# Patient Record
Sex: MALE | Race: BLACK OR AFRICAN AMERICAN | Employment: FULL TIME | URBAN - METROPOLITAN AREA
[De-identification: names, ages, dates, MRNs, and addresses within clinical notes are randomized per-mention and may not be internally consistent; named-entity substitution may affect disease eponyms.]

---

## 2020-08-12 ENCOUNTER — HOSPITAL ENCOUNTER (OUTPATIENT)
Age: 45
Discharge: HOME | End: 2020-08-14
Payer: COMMERCIAL

## 2020-08-12 ENCOUNTER — NURSE ONLY (OUTPATIENT)
Dept: PRIMARY CARE CLINIC | Age: 45
End: 2020-08-12

## 2020-08-12 PROCEDURE — U0003 INFECTIOUS AGENT DETECTION BY NUCLEIC ACID (DNA OR RNA); SEVERE ACUTE RESPIRATORY SYNDROME CORONAVIRUS 2 (SARS-COV-2) (CORONAVIRUS DISEASE [COVID-19]), AMPLIFIED PROBE TECHNIQUE, MAKING USE OF HIGH THROUGHPUT TECHNOLOGIES AS DESCRIBED BY CMS-2020-01-R: HCPCS

## 2020-08-14 LAB
SARS-COV-2: NOT DETECTED
SOURCE: NORMAL

## 2021-02-25 ENCOUNTER — HOSPITAL ENCOUNTER (OUTPATIENT)
Age: 46
Discharge: HOME | End: 2021-02-27
Payer: COMMERCIAL

## 2021-02-25 ENCOUNTER — VIRTUAL VISIT (OUTPATIENT)
Dept: FAMILY MEDICINE CLINIC | Age: 46
End: 2021-02-25
Payer: COMMERCIAL

## 2021-02-25 ENCOUNTER — HOSPITAL ENCOUNTER (OUTPATIENT)
Dept: GENERAL RADIOLOGY | Age: 46
Discharge: HOME | End: 2021-02-27
Payer: COMMERCIAL

## 2021-02-25 DIAGNOSIS — M54.42 ACUTE BILATERAL LOW BACK PAIN WITH LEFT-SIDED SCIATICA: Primary | ICD-10-CM

## 2021-02-25 DIAGNOSIS — M54.42 ACUTE BILATERAL LOW BACK PAIN WITH LEFT-SIDED SCIATICA: ICD-10-CM

## 2021-02-25 PROCEDURE — 72110 X-RAY EXAM L-2 SPINE 4/>VWS: CPT

## 2021-02-25 PROCEDURE — 99204 OFFICE O/P NEW MOD 45 MIN: CPT | Performed by: FAMILY MEDICINE

## 2021-02-25 RX ORDER — CYCLOBENZAPRINE HCL 5 MG
5 TABLET ORAL NIGHTLY PRN
Qty: 30 TABLET | Refills: 0 | Status: SHIPPED
Start: 2021-02-25 | End: 2021-04-01 | Stop reason: SDUPTHER

## 2021-02-25 RX ORDER — METHYLPREDNISOLONE 4 MG/1
TABLET ORAL
Qty: 1 KIT | Refills: 0 | Status: SHIPPED
Start: 2021-02-25 | End: 2021-03-02 | Stop reason: ALTCHOICE

## 2021-02-25 SDOH — ECONOMIC STABILITY: TRANSPORTATION INSECURITY
IN THE PAST 12 MONTHS, HAS THE LACK OF TRANSPORTATION KEPT YOU FROM MEDICAL APPOINTMENTS OR FROM GETTING MEDICATIONS?: NOT ASKED

## 2021-02-25 SDOH — HEALTH STABILITY: MENTAL HEALTH: HOW MANY STANDARD DRINKS CONTAINING ALCOHOL DO YOU HAVE ON A TYPICAL DAY?: NOT ASKED

## 2021-02-25 SDOH — ECONOMIC STABILITY: INCOME INSECURITY: HOW HARD IS IT FOR YOU TO PAY FOR THE VERY BASICS LIKE FOOD, HOUSING, MEDICAL CARE, AND HEATING?: NOT ASKED

## 2021-02-25 SDOH — ECONOMIC STABILITY: TRANSPORTATION INSECURITY
IN THE PAST 12 MONTHS, HAS LACK OF TRANSPORTATION KEPT YOU FROM MEETINGS, WORK, OR FROM GETTING THINGS NEEDED FOR DAILY LIVING?: NOT ASKED

## 2021-02-25 SDOH — ECONOMIC STABILITY: FOOD INSECURITY: WITHIN THE PAST 12 MONTHS, YOU WORRIED THAT YOUR FOOD WOULD RUN OUT BEFORE YOU GOT MONEY TO BUY MORE.: NEVER TRUE

## 2021-02-25 SDOH — ECONOMIC STABILITY: FOOD INSECURITY: WITHIN THE PAST 12 MONTHS, THE FOOD YOU BOUGHT JUST DIDN'T LAST AND YOU DIDN'T HAVE MONEY TO GET MORE.: NEVER TRUE

## 2021-02-25 ASSESSMENT — ENCOUNTER SYMPTOMS
SHORTNESS OF BREATH: 0
DIARRHEA: 0
BACK PAIN: 1
RHINORRHEA: 0
CONSTIPATION: 0
VOMITING: 0
SINUS PRESSURE: 0
WHEEZING: 0
ABDOMINAL PAIN: 0
NAUSEA: 0
COUGH: 0
SORE THROAT: 0

## 2021-02-25 ASSESSMENT — PATIENT HEALTH QUESTIONNAIRE - PHQ9
2. FEELING DOWN, DEPRESSED OR HOPELESS: 0
SUM OF ALL RESPONSES TO PHQ QUESTIONS 1-9: 0
1. LITTLE INTEREST OR PLEASURE IN DOING THINGS: 0
SUM OF ALL RESPONSES TO PHQ9 QUESTIONS 1 & 2: 0
SUM OF ALL RESPONSES TO PHQ QUESTIONS 1-9: 0
SUM OF ALL RESPONSES TO PHQ QUESTIONS 1-9: 0

## 2021-02-25 NOTE — PROGRESS NOTES
TeleMedicine Patient Consent    This visit was performed as a virtual video visit using a synchronous, two-way, audio-video telehealth technology platform. Patient identification was verified at the start of the visit, including the patient's telephone number and physical location. I discussed with the patient the nature of our telehealth visits, that:     1. Due to the nature of an audio- video modality, the only components of a physical exam that could be done are the elements supported by direct observation. 2. I would evaluate the patient and recommend diagnostics and treatments based on my assessment. 3. If it was felt that the patient should be evaluated in clinic or an emergency room setting, then they would be directed there. 4. Our sessions are not being recorded and that personal health information is protected. 5. Our team would provide follow up care in person if/when the patient needs it. Patient does agree to proceed with telemedicine consultation. Patient's location: home address in [place]. Is there anyone else present for this visit: No  This visit was completed virtually using [email or web address] me    Physician Location:   [place] [id number]    Time spent: Greater than Not billed by time    2021    TELEHEALTH EVALUATION -- Audio or Visual (During [id number] public health emergency)    HPI:    [facility] (:  1975) has requested an audio/video evaluation for the following concern(s):    Patient is a new patient to establish care. Previous PCP was Dr. Jose Arita at [address] [place] [address]. Patient is presenting today for back pain. He states that he has had the pain for about 3 weeks but has progressively gotten worse. Pain starts in his lower back and radiates down his legs but is worse on the left. He states that he slept wrong prior to the pain starting. He denies any trauma or injury. Pain is sharp, dull, aching, throbbing, stabbing. Pain is 8 out of 10. Alleviating factors: nothing. Exacerbating factors: walking. He has never had pain like this in the past.  He denies numbness or tingling in his legs. He does have weakness in his legs. He was seen by a chiropractor 2 days ago. Review of Systems   Constitutional: Negative for chills, fatigue and fever. HENT: Negative for congestion, ear discharge, ear pain, postnasal drip, rhinorrhea, sinus pressure, sneezing and sore throat. Respiratory: Negative for cough, shortness of breath and wheezing. Cardiovascular: Negative for chest pain, palpitations and leg swelling. Gastrointestinal: Negative for abdominal pain, constipation, diarrhea, nausea and vomiting. Genitourinary: Negative for dysuria, frequency and hematuria. Musculoskeletal: Positive for back pain. Negative for arthralgias and myalgias. Skin: Negative for rash. Neurological: Positive for weakness. Negative for dizziness, light-headedness, numbness and headaches. Prior to Visit Medications    Medication Sig Taking? Authorizing Provider   methylPREDNISolone (MEDROL DOSEPACK) 4 MG tablet Take by mouth.  Yes Anastacio Fitzgerald DO   cyclobenzaprine (FLEXERIL) 5 MG tablet Take 1 tablet by mouth nightly as needed for Muscle spasms Yes Anastacio Fitzgerald DO       Social History     Tobacco Use    Smoking status: Never Smoker    Smokeless tobacco: Never Used   Substance Use Topics    Alcohol use: Yes     Frequency: Monthly or less     Comment: social    Drug use: Never        Allergies   Allergen Reactions    Penicillins Rash , History reviewed. No pertinent past medical history. ,   Past Surgical History:   Procedure Laterality Date    WISDOM TOOTH EXTRACTION     ,   Social History     Tobacco Use    Smoking status: Never Smoker    Smokeless tobacco: Never Used   Substance Use Topics    Alcohol use: Yes     Frequency: Monthly or less     Comment: social    Drug use: Never   ,   Family History   Problem Relation Age of Onset    Colon Cancer Mother 78    Cancer Father         multiple myoloma    No Known Problems Sister     Diabetes Brother     No Known Problems Brother     No Known Problems Sister     No Known Problems Sister     No Known Problems Sister     No Known Problems Sister     Diabetes Sister     Other Sister         pulmonary HTN    No Known Problems Son     No Known Problems Daughter    ,   There is no immunization history on file for this patient.,   Health Maintenance   Topic Date Due    Hepatitis C screen  1975    HIV screen  08/10/1992    DTaP/Tdap/Td vaccine (1 - Tdap) 08/10/1996    Lipid screen  08/10/2017    Flu vaccine (1) 09/01/2020    Hepatitis A vaccine  Aged Out    Hepatitis B vaccine  Aged Out    Hib vaccine  Aged Out    Meningococcal (ACWY) vaccine  Aged Out    Pneumococcal 0-64 years Vaccine  Aged Out       PHYSICAL EXAMINATION:  [ INSTRUCTIONS:  \"[x]\" Indicates a positive item  \"[]\" Indicates a negative item  -- DELETE ALL ITEMS NOT EXAMINED]  Vital Signs: (As obtained by patient/caregiver or practitioner observation)    Blood pressure-  Heart rate-    Respiratory rate-    Temperature-  Pulse oximetry-   Vitals unable to be obtained.      Constitutional: [x] Appears well-developed and well-nourished [x] No apparent distress      [] Abnormal-   Mental status  [x] Alert and awake  [x] Oriented to person/place/time [x]Able to follow commands      Eyes:  EOM    [x]  Normal  [] Abnormal-  Sclera  [x]  Normal  [] Abnormal -         Discharge [x]  None visible  [] Abnormal -    HENT: [x] Normocephalic, atraumatic. [] Abnormal   [x] Mouth/Throat: Mucous membranes are moist.     External Ears [x] Normal  [] Abnormal-     Neck: [x] No visualized mass     Pulmonary/Chest: [x] Respiratory effort normal.  [x] No visualized signs of difficulty breathing or respiratory distress        [] Abnormal-      Musculoskeletal:   [] Normal gait with no signs of ataxia         [x] Normal range of motion of neck        [] Abnormal-       Neurological:        [x] No Facial Asymmetry (Cranial nerve 7 motor function) (limited exam to video visit)          [] No gaze palsy        [] Abnormal-         Skin:        [x] No significant exanthematous lesions or discoloration noted on facial skin         [] Abnormal-            Psychiatric:       [x] Normal Affect [x] No Hallucinations        [] Abnormal-       Other pertinent observable physical exam findings-     Due to this being a TeleHealth encounter, evaluation of the following organ systems is limited: Vitals/Constitutional/EENT/Resp/CV/GI//MS/Neuro/Skin/Heme-Lymph-Imm. ASSESSMENT/PLAN:  Jenney Runner was seen today for establish care. Diagnoses and all orders for this visit:    Acute bilateral low back pain with left-sided sciatica  -     XR LUMBAR SPINE (MIN 4 VIEWS); Future  -     methylPREDNISolone (MEDROL DOSEPACK) 4 MG tablet; Take by mouth. -     cyclobenzaprine (FLEXERIL) 5 MG tablet; Take 1 tablet by mouth nightly as needed for Muscle spasms  -     [facility], Robin Mehta  X-ray ordered  Referral to PT  Home exercises given on AVS.  Will start medrol dose pack  Side effects reviewed  Will start flexeril  Side effects reviewed. Return in about 2 weeks (around 3/11/2021) for back pain. An  electronic signature was used to authenticate this note.     --Allegra Conn,  on 2/25/2021 at 12:05 } Pursuant to the emergency declaration under the [facility], [facility] waiver authority and the [employer] and [employer] Act, this Virtual  Visit was conducted, with patient's consent, to reduce the patient's risk of exposure to COVID-19 and provide continuity of care for an established patient. Services were provided through a video synchronous discussion virtually to substitute for in-person clinic visit.

## 2021-02-25 NOTE — PROGRESS NOTES
Shleby Torres II was read the following message We want to confirm that, for purposes of billing, this is a virtual visit with your provider for which we will submit a claim for reimbursement with your insurance company. You will be responsible for any copays, coinsurance amounts or other amounts not covered by your insurance company. If you do not accept this, unfortunately we will not be able to schedule a virtual visit with the provider. Do you accept?  Felisha Oliver responded YES

## 2021-02-25 NOTE — PATIENT INSTRUCTIONS
Patient Education        Sciatica: Exercises  Introduction  Here are some examples of typical rehabilitation exercises for your condition. Start each exercise slowly. Ease off the exercise if you start to have pain. Your doctor or physical therapist will tell you when you can start these exercises and which ones will work best for you. When you are not being active, find a comfortable position for rest. Some people are comfortable on the floor or a medium-firm bed with a small pillow under their head and another under their knees. Some people prefer to lie on their side with a pillow between their knees. Don't stay in one position for too long. Take short walks (10 to 20 minutes) every 2 to 3 hours. Avoid slopes, hills, and stairs until you feel better. Walk only distances you can manage without pain, especially leg pain. How to do the exercises  Back stretches   1. Get down on your hands and knees on the floor. 2. Relax your head and allow it to droop. Round your back up toward the ceiling until you feel a nice stretch in your upper, middle, and lower back. Hold this stretch for as long as it feels comfortable, or about 15 to 30 seconds. 3. Return to the starting position with a flat back while you are on your hands and knees. 4. Let your back sway by pressing your stomach toward the floor. Lift your buttocks toward the ceiling. 5. Hold this position for 15 to 30 seconds. 6. Repeat 2 to 4 times. Follow-up care is a key part of your treatment and safety. Be sure to make and go to all appointments, and call your doctor if you are having problems. It's also a good idea to know your test results and keep a list of the medicines you take. Where can you learn more? Go to [email or web address] org and sign in to your [employer] account. Enter X329 in the Slinky box to learn more about \"Sciatica: Exercises. \" If you do not have an account, please click on the \"Sign Up Now\" link. Current as of: March 2, 2020               Content Version: 12.6  © 2006-2020 [employer]. Care instructions adapted under license by [facility] ([facility]). If you have questions about a medical condition or this instruction, always ask your healthcare professional. [address] any warranty or liability for your use of this information.

## 2021-03-02 ENCOUNTER — OFFICE VISIT (OUTPATIENT)
Dept: FAMILY MEDICINE CLINIC | Age: 46
End: 2021-03-02
Payer: COMMERCIAL

## 2021-03-02 VITALS
HEIGHT: 68 IN | DIASTOLIC BLOOD PRESSURE: 84 MMHG | WEIGHT: 315 LBS | RESPIRATION RATE: 18 BRPM | HEART RATE: 90 BPM | OXYGEN SATURATION: 96 % | SYSTOLIC BLOOD PRESSURE: 138 MMHG | BODY MASS INDEX: 47.74 KG/M2 | TEMPERATURE: 97.8 F

## 2021-03-02 DIAGNOSIS — M54.42 ACUTE BILATERAL LOW BACK PAIN WITH LEFT-SIDED SCIATICA: Primary | ICD-10-CM

## 2021-03-02 DIAGNOSIS — Z13.220 SCREENING, LIPID: ICD-10-CM

## 2021-03-02 DIAGNOSIS — Z13.1 SCREENING FOR DIABETES MELLITUS: ICD-10-CM

## 2021-03-02 LAB
ALBUMIN SERPL-MCNC: 4.1 G/DL (ref 3.5–5.2)
ALP BLD-CCNC: 80 U/L (ref 40–129)
ALT SERPL-CCNC: 23 U/L (ref 0–40)
ANION GAP SERPL CALCULATED.3IONS-SCNC: 9 MMOL/L (ref 7–16)
AST SERPL-CCNC: 19 U/L (ref 0–39)
BILIRUB SERPL-MCNC: <0.2 MG/DL (ref 0–1.2)
BUN BLDV-MCNC: 21 MG/DL (ref 6–20)
CALCIUM SERPL-MCNC: 9.4 MG/DL (ref 8.6–10.2)
CHLORIDE BLD-SCNC: 99 MMOL/L (ref 98–107)
CHOLESTEROL, TOTAL: 317 MG/DL (ref 0–199)
CO2: 28 MMOL/L (ref 22–29)
CREAT SERPL-MCNC: 1.2 MG/DL (ref 0.7–1.2)
GFR AFRICAN AMERICAN: >60
GFR NON-AFRICAN AMERICAN: >60 ML/MIN/1.73
GLUCOSE BLD-MCNC: 69 MG/DL (ref 74–99)
HDLC SERPL-MCNC: 58 MG/DL
LDL CHOLESTEROL CALCULATED: 231 MG/DL (ref 0–99)
POTASSIUM SERPL-SCNC: 4.4 MMOL/L (ref 3.5–5)
SODIUM BLD-SCNC: 136 MMOL/L (ref 132–146)
TOTAL PROTEIN: 7.9 G/DL (ref 6.4–8.3)
TRIGL SERPL-MCNC: 141 MG/DL (ref 0–149)
VLDLC SERPL CALC-MCNC: 28 MG/DL

## 2021-03-02 PROCEDURE — 99213 OFFICE O/P EST LOW 20 MIN: CPT | Performed by: FAMILY MEDICINE

## 2021-03-02 NOTE — PROGRESS NOTES
Pain:  Patient is here today with complaints of back pain. This is a/an acute problem. This has been going on for 6 week(s). Exacerbating factors include standing, sitting, laying down, walking. Alleviating factors include naproxen. Pain is dull ache and sharp in nature. 8/10. Pain is  Radiating into his legs but is worse on the left. This has not happen to patient before. Imaging to date includes x-ray that showed mild arthritis. Therapy to date includes none. He completed medrol dose pack which he thinks did help because he couldn't even walk at that time. He is starting physical therapy tomorrow. He has not gone back to work yet but is hoping to go back in 1 week after starting therapy. Patient's past medical, surgical, social and/or family history reviewed, updated in chart, and are non-contributory (unless otherwise stated). Medications and allergies also reviewed and updated in chart. Review of Systems:  Constitutional:  No fever, no fatigue, no chills, no headaches, no weight change  Dermatology:  No rash, no mole, no dry or sensitive skin  ENT:  No cough, no sore throat, no sinus pain, no runny nose, no ear pain  Cardiology:  No chest pain, no palpitations, no leg edema, no shortness of breath, no PND  Gastroenterology:  No dysphagia, no abdominal pain, no nausea, no vomiting, no constipation, no diarrhea, no heartburn  Musculoskeletal:  No joint pain, no leg cramps, + back pain, no muscle aches  Respiratory:  No shortness of breath, no orthopnea, no wheezing, no DILLON, no hemoptysis  Urology:  No blood in the urine, no urinary frequency, no urinary incontinence, no urinary urgency, no nocturia, no dysuria      Vitals:    03/02/21 1435   BP: 138/84   Pulse: 90   Resp: 18   Temp: 97.8 °F (36.6 °C)   SpO2: 96%   Weight: (!) 353 lb 9.6 oz (160.4 kg)   Height: 5' 8\" (1.727 m)       Physical Exam  Vitals signs and nursing note reviewed.    Constitutional: Appearance: He is well-developed. HENT:      Head: Normocephalic and atraumatic. Right Ear: External ear normal.      Left Ear: External ear normal.      Nose: Nose normal.   Eyes:      Conjunctiva/sclera: Conjunctivae normal.      Pupils: Pupils are equal, round, and reactive to light. Neck:      Musculoskeletal: Normal range of motion and neck supple. Thyroid: No thyromegaly. Cardiovascular:      Rate and Rhythm: Normal rate and regular rhythm. Heart sounds: Normal heart sounds. Pulmonary:      Effort: Pulmonary effort is normal.      Breath sounds: Normal breath sounds. No wheezing. Abdominal:      General: Bowel sounds are normal.      Palpations: Abdomen is soft. Tenderness: There is no abdominal tenderness. Musculoskeletal:      Lumbar back: He exhibits decreased range of motion, tenderness, pain and spasm. Skin:     General: Skin is warm and dry. Findings: No rash. Neurological:      Mental Status: He is alert and oriented to person, place, and time. Deep Tendon Reflexes: Reflexes are normal and symmetric. Psychiatric:         Behavior: Behavior normal.         Assessment/Plan:      Renetta Robertson was seen today for back pain. Diagnoses and all orders for this visit:    Acute bilateral low back pain with left-sided sciatica  -     diclofenac (VOLTAREN) 50 MG EC tablet; Take 1 tablet by mouth 3 times daily (with meals)  Will start diclofenac  Side effects reviewed  Referred to PT  Reviewed x-ray results  Will await additional recommendations. As above. Call or go to ED immediately if symptoms worsen or persist.  Return in about 4 weeks (around 3/30/2021) for back pain. , or sooner if necessary. Educational materials and/or home exercises printed for patient's review and were included in patient instructions on his/her After Visit Summary and given to patient at the end of visit. Counseled regarding above diagnosis, including possible risks and complications,  especially if left uncontrolled. Counseled regarding the possible side effects, risks, benefits and alternatives to treatment; patient and/or guardian verbalizes understanding, agrees, feels comfortable with and wishes to proceed with above treatment plan. Advised patient to call with any new medication issues, and read all Rx info from pharmacy to assure aware of all possible risks and side effects of medication before taking. Reviewed age and gender appropriate health screening exams and vaccinations. Advised patient regarding importance of keeping up with recommended health maintenance and to schedule as soon as possible if overdue, as this is important in assessing for undiagnosed pathology, especially cancer, as well as protecting against potentially harmful/life threatening disease. Patient and/or guardian verbalizes understanding and agrees with above counseling, assessment and plan. All questions answered. Tea Fitzgerald DO  3/2/2021    I have personally reviewed and updated the chief complaint, HPI, Past Medical, Family and Social History, as well as the above Review of Systems.

## 2021-03-02 NOTE — PATIENT INSTRUCTIONS
Patient Education        diclofenac  Pronunciation:  dye KLOE fen ak  Brand:  Aura Headings, Zorvolex  What is the most important information I should know about diclofenac? Diclofenac can increase your risk of fatal heart attack or stroke. Do not use this medicine just before or after heart bypass surgery (coronary artery bypass graft, or CABG). Diclofenac may also cause stomach or intestinal bleeding, which can be fatal.  What is diclofenac? Diclofenac is a nonsteroidal anti-inflammatory drug (NSAID) that is used to treat mild to moderate pain, or signs and symptoms of osteoarthritis. Diclofenac powder (Cambia) is used to treat a migraine headache attack. Frankey Countess will only treat  a headache that has already begun. It will not prevent headaches or reduce the number of attacks. Diclofenac may also be used for purposes not listed in this medication guide. What should I discuss with my healthcare provider before taking diclofenac? Diclofenac can increase your risk of fatal heart attack or stroke, even if you don't have any risk factors. Do not use this medicine just before or after heart bypass surgery (coronary artery bypass graft, or CABG). Diclofenac may also cause stomach or intestinal bleeding, which can be fatal. These conditions can occur without warning while you are using diclofenac, especially in older adults. You should not use diclofenac if you are allergic to it, or if you have ever had an asthma attack or severe allergic reaction after taking aspirin or an NSAID. Do not use Cambia to treat a cluster headache. Do not use Zipsor if you are allergic to beef or beef protein. Tell your doctor if you have ever had:  · heart disease, high blood pressure;  · ulcers or bleeding in your stomach;  · asthma;  · liver or kidney disease; or  · if you smoke. Taking diclofenac during the last 3 months of pregnancy may harm the unborn baby. Tell your doctor if you are pregnant or plan to become pregnant. It may not be safe to breastfeed while using this medicine. Ask your doctor about any risk. Diclofenac is not approved for use by anyone younger than 25years old. How should I take diclofenac? Different brands of diclofenac contain different amounts of this medicine, and may have different uses. If you switch brands, your dose needs may change. Follow your doctor's instructions about how much medicine to take. Ask your pharmacist if you have any questions about the brand of diclofenac you receive at the pharmacy. Follow all directions on your prescription label and read all medication guides. Your doctor may occasionally change your dose. Use the lowest dose that is effective in treating your condition. Swallow the tablet  whole and do not crush, chew, or break it. Take Zorvolex on an empty stomach, at least 1 hour before or 2 hours after a meal.  Dissolve diclofenac powder (Cambia) with 1 to 2 ounces of water. Do not use any other type of liquid. Stir this mixture and drink all of it right away. Diclofenac powder works best if you take it on an empty stomach. Call your doctor if your headache does not completely go away after taking Cambia. If you use diclofenac long-term, you may need frequent medical tests. Store at room temperature away from moisture and heat. Keep the bottle tightly closed when not in use. What happens if I miss a dose? Take the missed dose as soon as you remember. Skip the missed dose if it is almost time for your next scheduled dose. Do not take extra medicine to make up the missed dose. What happens if I overdose? Seek emergency medical attention or call the Poison Help line at 1-314.814.5473. What should I avoid while taking diclofenac? Avoid drinking alcohol. It may increase your risk of stomach bleeding. Avoid taking aspirin or other NSAIDs unless your doctor tells you to. Ask a doctor or pharmacist before using other medicines for pain, fever, swelling, or cold/flu symptoms. They may contain ingredients similar to diclofenac (such as aspirin, ibuprofen, ketoprofen, or naproxen). What are the possible side effects of diclofenac? Get emergency medical help if you have signs of an allergic reaction (hives, difficult breathing, swelling in your face or throat) or a severe skin reaction (fever, sore throat, burning eyes, skin pain, red or purple skin rash with blistering and peeling). Get emergency medical help if you have signs of a heart attack or stroke: chest pain spreading to your jaw or shoulder, sudden numbness or weakness on one side of the body, slurred speech, feeling short of breath. Stop using diclofenac and call your doctor at once if you have:  · the first sign of any skin rash, no matter how mild;  · flu-like symptoms;  · heart problems --swelling, rapid weight gain, feeling short of breath;  · kidney problems --little or no urinating, painful or difficult urination, swelling in your arms or legs, feeling tired or short of breath;  · liver problems --nausea, diarrhea, stomach pain (upper right side), tiredness, itching, dark urine, jaundice (yellowing of the skin or eyes); or  · signs of stomach bleeding --bloody or tarry stools, coughing up blood or vomit that looks like coffee grounds. Common side effects may include:  · indigestion, gas, nausea, vomiting, stomach pain;  · diarrhea, constipation;  · headache, dizziness, drowsiness;  · abnormal lab tests;  · itching, sweating;  · stuffy nose;  · increased blood pressure; or  · swelling or pain in your arms or legs. This is not a complete list of side effects and others may occur. Call your doctor for medical advice about side effects. You may report side effects to FDA at 9-751-FDA-1393. What other drugs will affect diclofenac? Ask your doctor before using diclofenac if you take an antidepressant. Taking certain antidepressants with an NSAID may cause you to bruise or bleed easily. Tell your doctor about all your other medicines, especially:  · heart or blood pressure medication, including a diuretic or \"water pill\";  · other forms of diclofenac (Arthrotec, Flector, Pennsaid, Solaraze, Voltaren Gel);  · a blood thinner --warfarin, Coumadin, Jantoven; or  · other NSAIDs --aspirin, ibuprofen (Advil, Motrin), naproxen (Aleve), celecoxib (Celebrex), indomethacin, meloxicam, and others. This list is not complete. Other drugs may affect diclofenac, including prescription and over-the-counter medicines, vitamins, and herbal products. Not all possible drug interactions are listed here. Where can I get more information? Your pharmacist can provide more information about diclofenac. Remember, keep this and all other medicines out of the reach of children, never share your medicines with others, and use this medication only for the indication prescribed.

## 2021-03-03 ENCOUNTER — HOSPITAL ENCOUNTER (OUTPATIENT)
Dept: PHYSICAL THERAPY | Age: 46
Setting detail: THERAPIES SERIES
Discharge: HOME | End: 2021-03-03
Payer: COMMERCIAL

## 2021-03-03 PROCEDURE — 97161 PT EVAL LOW COMPLEX 20 MIN: CPT | Performed by: PHYSICAL THERAPIST

## 2021-03-03 ASSESSMENT — PAIN DESCRIPTION - PAIN TYPE: TYPE: ACUTE PAIN

## 2021-03-03 ASSESSMENT — PAIN DESCRIPTION - DESCRIPTORS: DESCRIPTORS: ACHING;CONSTANT

## 2021-03-03 ASSESSMENT — PAIN DESCRIPTION - LOCATION: LOCATION: BACK;BUTTOCKS;LEG

## 2021-03-03 NOTE — PROGRESS NOTES
Physical Therapy  Initial Assessment  Date: 3/3/2021  Patient Name: Tunde Armendariz  MRN: [id number]  : 1975           Subjective   General  Chart Reviewed: Yes  Referring Practitioner: Harinder Crespo   Referral Date : 21  Diagnosis: chronic LBP  PT Visit Information  Onset Date: 21  PT Insurance Information: [place] [employer]  Total # of Visits Approved: 6-8  Total # of Visits to Date: 1  Subjective  Subjective: pt presents to therapy with c/o LBP for ~ 2 weeks of insidious onset; no PMH for LB/LE injury; tells PT that aching persists across mid back into L buttock and  hamstring; x-ray + for mild DDD L5-S1; no c/o N/T or buckling B LE's; MEDS do help; sleep is fine; no ortho/neuro/pain management at this time; RTD for follow-up 21  Pain Screening  Patient Currently in Pain: Yes  Pain Assessment  Pain Assessment: 0-10  Pain Level: 6  Pain Type: Acute pain  Pain Location: Back;Buttocks;Leg  Pain Orientation: Right;Left  Pain Descriptors: Aching;Constant  Functional Pain Assessment: Prevents or interferes with many active not passive activities  Vital Signs  Patient Currently in Pain: Yes    Objective     Observation/Palpation  Palpation: discomfort noted across B LB paraspinals L1-5 into SI lines  Observation: posture:  level ASIS/PSIS/iliacs; ant pelvic tilt noted    AROM RLE (degrees)  RLE AROM: WNL  AROM LLE (degrees)  LLE AROM : WNL  AROM RUE (degrees)  RUE AROM : WNL  AROM LUE (degrees)  LUE AROM : WNL  Spine  Lumbar: limited ~ 25% WNL for all ranges with no c/o radiculopathy noted  Special Tests: hyperflex/rot  +/+; slump  + L LE    Strength Other  Other: grossly 5/5 for all ranges B LE's     Additional Measures  Other: endurance for all prolonged activities is GOOD  Sensation  Overall Sensation Status: WNL     Ambulation  Ambulation?: Yes  Ambulation 1  Surface: level tile  Device: No Device  Assistance: Independent  Quality of Gait: normal mechanics noted B LE's/trunkk  Balance  Comments: static/dynamic balance is GOOD+/NORMAL     Assessment   Conditions Requiring Skilled Therapeutic Intervention  Body structures, Functions, Activity limitations: Decreased ROM; Decreased endurance  Assessment: pain noted across B sides LB/L LE with all prolonged activities, 6/10  Prognosis: Fair;Good  Decision Making: Low Complexity  Activity Tolerance  Activity Tolerance: Patient Tolerated treatment well       Plan   Plan  Times per week: pt to be seen 2x/week/3-4 weeks  Current Treatment Recommendations: ROM, Strengthening, Endurance Training, Modalities, Home Exercise Program    Goals  Short term goals  Time Frame for Short term goals: 2 weeks  Short term goal 1: decrease pain across B sides LB/L LE with all prolonged activities, 3/10  Short term goal 2: restore LB AROM to WNL for all ranges  Short term goal 3: improve endurance for all prolonged activities to GOOD  Long term goals  Time Frame for Long term goals : 3-4 weeks  Long term goal 1: decrease pain across B sides LB/L LE with all prolonged activities, 0-2/10  Long term goal 2: improve endurance for all prolonged activities to [address] term goal 3: assure I with [facility] for home management of condition    Patient goals : to get rid of the pain in his back/buttock       Presley Beth, PT

## 2021-03-03 NOTE — PROGRESS NOTES
[address] [facility]                Phone: [phone]   Fax: [phone]    Physical Therapy Daily Treatment Note  Date:  3/3/2021    Patient Name:  Arvie Opitz    :  1975  MRN: [id number]    Evaluating therapist:  BOBBY Fletcher                (3/3/21)  Restrictions/Precautions:    Diagnosis:  acute LBP with sciatica  Treatment Diagnosis:    Insurance/Certification information:  [employer] [address] [employer] [address] [place] [address]  Referring Physician:  Yoli Styles. Plan of care signed (Y/N):    Visit# / total visits:    Pain level: 6/10   Time In:  Time Out:    Subjective:      Exercises:  Exercise/Equipment Resistance/Repetitions Other comments   StepOne              tball flex/rot             rot st     SKTC     piriformis st     hamst st            pelvic tilts               bridges                                                                Other Therapeutic Activities:      Home Exercise Program:  provided 3/3/21    Manual Treatments:      Modalities:  IFC/MH to LB PRN     Timed Code Treatment Minutes: Total Treatment Minutes:      Treatment/Activity Tolerance:  [] Patient tolerated treatment well [] Patient limited by fatique  [] Patient limited by pain  [] Patient limited by other medical complications  [] Other:     Prognosis: [] Good [] Fair  [] Poor    Patient Requires Follow-up: [] Yes  [] No    Plan:   [] Continue per plan of care [] Alter current plan (see comments)  [] Plan of care initiated [] Hold pending MD visit [] Discharge  Plan for Next Session:      See Weekly Progress Note: []  Yes  []  No  Next due:        Electronically signed by:   Maisha Babcock

## 2021-03-05 ENCOUNTER — TELEPHONE (OUTPATIENT)
Dept: FAMILY MEDICINE CLINIC | Age: 46
End: 2021-03-05

## 2021-03-05 NOTE — TELEPHONE ENCOUNTER
Patient states he was told to call after he went to physical therapy. He says that he is okay, he feels a little stiff but no big deal. Wants to know if you can sign his forms to return to work.

## 2021-03-08 ENCOUNTER — HOSPITAL ENCOUNTER (OUTPATIENT)
Dept: PHYSICAL THERAPY | Age: 46
Setting detail: THERAPIES SERIES
Discharge: HOME | End: 2021-03-08
Payer: COMMERCIAL

## 2021-03-08 PROCEDURE — G0283 ELEC STIM OTHER THAN WOUND: HCPCS

## 2021-03-08 PROCEDURE — 97110 THERAPEUTIC EXERCISES: CPT

## 2021-03-08 NOTE — PROGRESS NOTES
[address] [facility]                Phone: [phone]   Fax: [phone]    Physical Therapy Daily Treatment Note  Date:  3/8/2021    Patient Name:  Riki Essex    :  1975  MRN: [id number]    Evaluating therapist:  BOBBY Andersen                (3/3/21)  Restrictions/Precautions:    Diagnosis:  acute LBP with sciatica  Treatment Diagnosis:    Insurance/Certification information:  [address] [place] [address]  Referring Physician:  Ariel Maldonado. Plan of care signed (Y/N):    Visit# / total visits:    Pain level: 6-7/10   Time In: 1430  Time Out:  1535    Subjective:  Pt reported his pain is in LB and radiating into L HS. Exercises:  Exercise/Equipment Resistance/Repetitions Other comments   StepOne   X 10 mins           tball flex/rot  20 sec x 3 reps            rot st 15 sec x 3 reps     SKTC 2-3 sec x 10 reps B    piriformis st 20 sec x 3 reps B    hamst st 20 sec x 3 reps B            pelvic tilts 5 sec x 10 reps               bridges  X 10 reps                                                               Other   Pt performed above ex w/ fair pacing and effort.  Pain remained 6/10 after session    Home Exercise Program:  provided 3/3/21    Manual Treatments:  NA    Modalities:  IFC/MH to LB x 15 mins     Timed Code Treatment Minutes:  50    Total Treatment Minutes:  65    Treatment/Activity Tolerance:  [x] Patient tolerated treatment well [] Patient limited by fatique  [] Patient limited by pain  [] Patient limited by other medical complications  [] Other:     Prognosis: [x] Good [] Fair  [] Poor    Patient Requires Follow-up: [x] Yes  [] No    Plan:   [x] Continue per plan of care [] Alter current plan (see comments)  [] Plan of care initiated [] Hold pending MD visit [] Discharge  Plan for Next Session:      See Weekly Progress Note: []  Yes  [x]  No  Next due:        Electronically signed by:  Miguel A Sen PTA [id number]

## 2021-03-10 ENCOUNTER — HOSPITAL ENCOUNTER (OUTPATIENT)
Dept: PHYSICAL THERAPY | Age: 46
Setting detail: THERAPIES SERIES
Discharge: HOME | End: 2021-03-10
Payer: COMMERCIAL

## 2021-03-10 PROCEDURE — 97110 THERAPEUTIC EXERCISES: CPT

## 2021-03-10 PROCEDURE — G0283 ELEC STIM OTHER THAN WOUND: HCPCS

## 2021-03-10 NOTE — PROGRESS NOTES
[address] [facility]                Phone: [phone]   Fax: [phone]    Physical Therapy Daily Treatment Note  Date:  3/10/2021    Patient Name:  Teodoro Gastelum    :  1975  MRN: [id number]    Evaluating therapist:  BOBBY Stahl                (3/3/21)  Restrictions/Precautions:    Diagnosis:  acute LBP with sciatica  Treatment Diagnosis:    Insurance/Certification information:  [address] [place] [address]  Referring Physician:  Eder Montejo. Plan of care signed (Y/N):    Visit# / total visits:  3/6  Pain level: 5/10   Time In: 1430  Time Out:  1535    Subjective:  Pt reported his pain is in LB and radiating into L HS. Pt reported that after his session on Monday, he had significant pain on Tuesday, and difficulty amb that day. Exercises:  Exercise/Equipment Resistance/Repetitions Other comments   StepOne   X 10 mins           tball flex/rot  20 sec x 3 reps            rot st 15 sec x 3 reps     SKTC 2-3 sec x 10 reps B    piriformis st 20 sec x 3 reps B    hamst st 20 sec x 3 reps B            pelvic tilts 5 sec x 10 reps               bridges  X 10 reps                                                               Other   Pt performed above ex w/ fair pacing and effort.  Pain remained /10 after session    Home Exercise Program:  provided 3/3/21    Manual Treatments:  NA    Modalities:  IFC/MH to LB x 15 mins     Timed Code Treatment Minutes:  50    Total Treatment Minutes:  65    Treatment/Activity Tolerance:  [x] Patient tolerated treatment well [] Patient limited by fatique  [] Patient limited by pain  [] Patient limited by other medical complications  [] Other:     Prognosis: [x] Good [] Fair  [] Poor    Patient Requires Follow-up: [x] Yes  [] No    Plan:   [x] Continue per plan of care [] Alter current plan (see comments)  [] Plan of care initiated [] Hold pending MD visit [] Discharge  Plan for Next Session:      See Weekly Progress Note: []  Yes  [x]  No  Next due:        Electronically signed by:  Lynsey Potter PTA [id number]

## 2021-03-11 ENCOUNTER — TELEPHONE (OUTPATIENT)
Dept: FAMILY MEDICINE CLINIC | Age: 46
End: 2021-03-11

## 2021-03-11 NOTE — TELEPHONE ENCOUNTER
Please call Lisbet Muller, we had already completed his paperwork for [employer] and now I have another form on my desk. Was something wrong with the previous one?

## 2021-03-15 ENCOUNTER — HOSPITAL ENCOUNTER (OUTPATIENT)
Dept: PHYSICAL THERAPY | Age: 46
Setting detail: THERAPIES SERIES
Discharge: HOME | End: 2021-03-15
Payer: COMMERCIAL

## 2021-03-15 PROCEDURE — 97110 THERAPEUTIC EXERCISES: CPT

## 2021-03-15 PROCEDURE — G0283 ELEC STIM OTHER THAN WOUND: HCPCS

## 2021-03-15 NOTE — PROGRESS NOTES
[address] [facility]                Phone: [phone]   Fax: [phone]    Physical Therapy Daily Treatment Note  Date:  3/15/2021    Patient Name:  Teodoro Gastelum    :  1975  MRN: [id number]    Evaluating therapist:  BOBBY Stahl                (3/3/21)  Restrictions/Precautions:    Diagnosis:  acute LBP with sciatica  Treatment Diagnosis:    Insurance/Certification information:  [address] [place] [address]  Referring Physician:  Eder Montejo. Plan of care signed (Y/N):    Visit# / total visits:    Pain level: 7/10   Time In: 1335  Time Out:  1432    Subjective:  Pt reported his pain is in LB and radiating into L HS. Pt reports that pain gets worse the longer he is up and moving throughout the day. Pt reports he gets very little to no relief from his pain meds. Exercises:  Exercise/Equipment Resistance/Repetitions Other comments   StepOne   X 10 mins           tball flex/rot  20 sec x 3 reps            rot st 15 sec x 3 reps     SKTC  W/ OFB behind knee 3-5 sec x 10 reps B    piriformis st  20 sec x 3 reps B    hamst st seated w/ stool 20 sec x 3 reps B            pelvic tilts 5 sec x 10 reps               bridges  5 sec X 10 reps                                                               Other   Pt performed above ex w/ fair pacing and effort. Pain remained 7/10 after session, and receiving IFC as noted. Pt reported compliance w/HEP as tolerated.      Home Exercise Program:  provided 3/3/21    Manual Treatments:  NA    Modalities:  IFC/MH to LB x 15 mins     Timed Code Treatment Minutes:  42    Total Treatment Minutes:  57    Treatment/Activity Tolerance:  [] Patient tolerated treatment well [] Patient limited by fatique  [x] Patient limited by pain  [] Patient limited by other medical complications  [] Other:     Prognosis: [] Good [x] Fair  [] Poor    Patient Requires Follow-up: [x] Yes  [] No    Plan:   [x] Continue per plan of care [] Alter current plan (see comments)  [] Plan of care initiated [] Hold pending MD visit [] Discharge  Plan for Next Session:      See Weekly Progress Note: []  Yes  [x]  No  Next due:        Electronically signed by:  Kiley Hall PTA [id number]

## 2021-03-17 ENCOUNTER — HOSPITAL ENCOUNTER (OUTPATIENT)
Dept: PHYSICAL THERAPY | Age: 46
Setting detail: THERAPIES SERIES
Discharge: HOME | End: 2021-03-17
Payer: COMMERCIAL

## 2021-03-17 PROCEDURE — 97110 THERAPEUTIC EXERCISES: CPT

## 2021-03-17 PROCEDURE — G0283 ELEC STIM OTHER THAN WOUND: HCPCS

## 2021-03-17 NOTE — PROGRESS NOTES
[address] [facility]                Phone: [phone]   Fax: [phone]    Physical Therapy Daily Treatment Note  Date:  3/17/2021    Patient Name:  Gail Ewing    :  1975  MRN: [id number]    Evaluating therapist:  BOBBY Alba                (3/3/21)  Restrictions/Precautions:    Diagnosis:  acute LBP with sciatica  Treatment Diagnosis:    Insurance/Certification information:  [address] [place] [address]  Referring Physician:  Joe Jacobo Plan of care signed (Y/N):    Visit# / total visits:  6  Pain level: 5-7/10   Time In: 1100  Time Out:  1155    Subjective:  Pt reported his pain is in LB and radiating into L HS. Pt reports that pain gets worse the longer he is up and moving throughout the day. Pt reports he gets very little to no relief from his pain meds. Pt also reporting now he is getting \"numbness \" down L LE and into his L heel. Exercises:  Exercise/Equipment Resistance/Repetitions Other comments   StepOne   X 10 mins           tball flex/rot  20 sec x 3 reps            rot st 15 sec x 3 reps     SKTC  W/ OFB behind knee 3-5 sec x 10 reps B    piriformis st  20 sec x 3 reps B    hamst st seated w/ stool 20 sec x 3 reps B            pelvic tilts 5 sec x 10 reps               bridges  5 sec X 10 reps                                                               Other   Pt performed above ex w/ fair pacing and effort. Pain remained 7/10 after session, and receiving IFC as noted. Pt reported compliance w/HEP as tolerated. Pt's LB rom remains limited by pain and not WFL. Pt's endurance for prolonged activity remains fair to fair - dependant on pain    Home Exercise Program:  provided 3/3/21.   3/17/21 pt provided w/ HEP inclusive of all ex except step one and Tball flex/rot as pt does not have access to the equip. A handout was provided and reviewed w/ pt.  Pt demonstrated good form and understanding of ex to therapist.     Manual Treatments:  NA    Modalities:  IFC/MH to LB x 15 mins     Timed Code Treatment Minutes:  40    Total Treatment Minutes:  55    Treatment/Activity Tolerance:  [] Patient tolerated treatment well [] Patient limited by fatique  [x] Patient limited by pain  [] Patient limited by other medical complications  [] Other:     Prognosis: [] Good [x] Fair  [] Poor    Patient Requires Follow-up: [] Yes  [] No    Plan:   [] Continue per plan of care [] Alter current plan (see comments)  [] Plan of care initiated [] Hold pending MD visit [x] Discharge    Plan for Next Session:  Pt was referred back to his physician for further acessment/ treatment      See Weekly Progress Note: []  Yes  [x]  No  Next due:        Electronically signed by:  Dalton Armas PTA [id number]

## 2021-03-18 ENCOUNTER — OFFICE VISIT (OUTPATIENT)
Dept: FAMILY MEDICINE CLINIC | Age: 46
End: 2021-03-18
Payer: COMMERCIAL

## 2021-03-18 DIAGNOSIS — M54.42 ACUTE BILATERAL LOW BACK PAIN WITH LEFT-SIDED SCIATICA: Primary | ICD-10-CM

## 2021-03-18 PROCEDURE — 99213 OFFICE O/P EST LOW 20 MIN: CPT | Performed by: FAMILY MEDICINE

## 2021-03-18 ASSESSMENT — ENCOUNTER SYMPTOMS
ABDOMINAL PAIN: 0
RHINORRHEA: 0
BACK PAIN: 1
NAUSEA: 0
COUGH: 0
WHEEZING: 0
SORE THROAT: 0
CONSTIPATION: 0
VOMITING: 0
SHORTNESS OF BREATH: 0
SINUS PRESSURE: 0
DIARRHEA: 0

## 2021-03-18 NOTE — PROGRESS NOTES
TeleMedicine Patient Consent    This visit was performed as a virtual video visit using a synchronous, two-way, audio-video telehealth technology platform. Patient identification was verified at the start of the visit, including the patient's telephone number and physical location. I discussed with the patient the nature of our telehealth visits, that:     1. Due to the nature of an audio- video modality, the only components of a physical exam that could be done are the elements supported by direct observation. 2. I would evaluate the patient and recommend diagnostics and treatments based on my assessment. 3. If it was felt that the patient should be evaluated in clinic or an emergency room setting, then they would be directed there. 4. Our sessions are not being recorded and that personal health information is protected. 5. Our team would provide follow up care in person if/when the patient needs it. Patient does agree to proceed with telemedicine consultation. Patient's location: home address in [place]. Is there anyone else present for this visit: No  This visit was completed virtually using [email or web address] me    Physician Location:   [place] [address]    Time spent: Greater than Not billed by time    3/18/2021    TELEHEALTH EVALUATION -- Audio or Visual (During CKAQQ-02 public health emergency)    HPI:    Prince Castillo II (:  1975) has requested an audio/video evaluation for the following concern(s):    Patient is presenting today for follow up of back pain. He states that his pain is now radiating down his left leg. He states that it started about 1 week ago. Pain is sharp, dull, aching, throbbing. He states that he has numbness of his left leg. He states numbness down the back of his left leg into his heel of his foot. He denies any trauma or injury. Pain is 7 out of 10. Alleviating factors: flexeril, voltaren, and heat.   Exacerbating factors: full ROM. He was going to therapy but states that the insurance told him yesterday was his last day. He states that his understanding is insurance won't pay for more therapy. They only approved 4 sessions. He states that he has weakness in his leg as well. Review of Systems   Constitutional: Negative for chills, fatigue and fever. HENT: Negative for congestion, ear discharge, ear pain, postnasal drip, rhinorrhea, sinus pressure, sneezing and sore throat. Respiratory: Negative for cough, shortness of breath and wheezing. Cardiovascular: Negative for chest pain, palpitations and leg swelling. Gastrointestinal: Negative for abdominal pain, constipation, diarrhea, nausea and vomiting. Genitourinary: Negative for dysuria, frequency and hematuria. Musculoskeletal: Positive for back pain. Negative for arthralgias and myalgias. Skin: Negative for rash. Neurological: Positive for weakness and numbness. Negative for dizziness, light-headedness and headaches. Prior to Visit Medications    Medication Sig Taking? Authorizing Provider   diclofenac (VOLTAREN) 50 MG EC tablet Take 1 tablet by mouth 3 times daily (with meals)  Jaguar Miller DO   cyclobenzaprine (FLEXERIL) 5 MG tablet Take 1 tablet by mouth nightly as needed for Muscle spasms  Jaguar Miller, DO       Social History     Tobacco Use    Smoking status: Never Smoker    Smokeless tobacco: Never Used   Substance Use Topics    Alcohol use: Yes     Frequency: Monthly or less     Comment: social    Drug use: Never        Allergies   Allergen Reactions    Penicillins Rash   , History reviewed. No pertinent past medical history. ,   Past Surgical History:   Procedure Laterality Date    WISDOM TOOTH EXTRACTION     ,   Social History     Tobacco Use    Smoking status: Never Smoker    Smokeless tobacco: Never Used   Substance Use Topics    Alcohol use: Yes     Frequency: Monthly or less     Comment: social    Drug use: Never   , Family History   Problem Relation Age of Onset    Colon Cancer Mother 78    Cancer Father         multiple myoloma    No Known Problems Sister     Diabetes Brother     No Known Problems Brother     No Known Problems Sister     No Known Problems Sister     No Known Problems Sister     No Known Problems Sister     Diabetes Sister     Other Sister         pulmonary HTN    No Known Problems Son     No Known Problems Daughter    ,   There is no immunization history on file for this patient.,   Health Maintenance   Topic Date Due    Hepatitis C screen  Never done    HIV screen  Never done    DTaP/Tdap/Td vaccine (1 - Tdap) Never done    Flu vaccine (1) Never done    Lipid screen  03/02/2026    Hepatitis A vaccine  Aged Out    Hepatitis B vaccine  Aged Out    Hib vaccine  Aged Out    Meningococcal (ACWY) vaccine  Aged Out    Pneumococcal 0-64 years Vaccine  Aged Out       PHYSICAL EXAMINATION:  [ INSTRUCTIONS:  \"[x]\" Indicates a positive item  \"[]\" Indicates a negative item  -- DELETE ALL ITEMS NOT EXAMINED]  Vital Signs: (As obtained by patient/caregiver or practitioner observation)    Blood pressure-  Heart rate-    Respiratory rate-    Temperature-  Pulse oximetry-   Vitals unable to be obtained. Constitutional: [x] Appears well-developed and well-nourished [x] No apparent distress      [] Abnormal-   Mental status  [x] Alert and awake  [x] Oriented to person/place/time [x]Able to follow commands      Eyes:  EOM    [x]  Normal  [] Abnormal-  Sclera  [x]  Normal  [] Abnormal -         Discharge [x]  None visible  [] Abnormal -    HENT:   [x] Normocephalic, atraumatic.   [] Abnormal   [x] Mouth/Throat: Mucous membranes are moist.     External Ears [x] Normal  [] Abnormal-     Neck: [x] No visualized mass     Pulmonary/Chest: [x] Respiratory effort normal.  [x] No visualized signs of difficulty breathing or respiratory distress        [] Abnormal-      Musculoskeletal:   [] Normal gait with no signs of ataxia         [x] Normal range of motion of neck        [] Abnormal-       Neurological:        [x] No Facial Asymmetry (Cranial nerve 7 motor function) (limited exam to video visit)          [] No gaze palsy        [] Abnormal-         Skin:        [x] No significant exanthematous lesions or discoloration noted on facial skin         [] Abnormal-            Psychiatric:       [x] Normal Affect [x] No Hallucinations        [] Abnormal-       Other pertinent observable physical exam findings-     Due to this being a TeleHealth encounter, evaluation of the following organ systems is limited: Vitals/Constitutional/EENT/Resp/CV/GI//MS/Neuro/Skin/Heme-Lymph-Imm. ASSESSMENT/PLAN:  Diagnoses and all orders for this visit:    Acute bilateral low back pain with left-sided sciatica  -     MRI LUMBAR SPINE [address]; Future  MRI ordered  Will await results  He has completed PT with symptoms worsening. Return if symptoms worsen or fail to improve. An  electronic signature was used to authenticate this note. --Elmer Martinez DO on 3/18/2021 at 4:16 }    Pursuant to the emergency declaration under the [facility], [facility] waiver authority and the [employer] Act, this Virtual  Visit was conducted, with patient's consent, to reduce the patient's risk of exposure to COVID-19 and provide continuity of care for an established patient. Services were provided through a video synchronous discussion virtually to substitute for in-person clinic visit.

## 2021-03-18 NOTE — PATIENT INSTRUCTIONS
Patient Education        Sciatica: Exercises  Introduction  Here are some examples of typical rehabilitation exercises for your condition. Start each exercise slowly. Ease off the exercise if you start to have pain. Your doctor or physical therapist will tell you when you can start these exercises and which ones will work best for you. When you are not being active, find a comfortable position for rest. Some people are comfortable on the floor or a medium-firm bed with a small pillow under their head and another under their knees. Some people prefer to lie on their side with a pillow between their knees. Don't stay in one position for too long. Take short walks (10 to 20 minutes) every 2 to 3 hours. Avoid slopes, hills, and stairs until you feel better. Walk only distances you can manage without pain, especially leg pain. How to do the exercises  Back stretches   1. Get down on your hands and knees on the floor. 2. Relax your head and allow it to droop. Round your back up toward the ceiling until you feel a nice stretch in your upper, middle, and lower back. Hold this stretch for as long as it feels comfortable, or about 15 to 30 seconds. 3. Return to the starting position with a flat back while you are on your hands and knees. 4. Let your back sway by pressing your stomach toward the floor. Lift your buttocks toward the ceiling. 5. Hold this position for 15 to 30 seconds. 6. Repeat 2 to 4 times. Follow-up care is a key part of your treatment and safety. Be sure to make and go to all appointments, and call your doctor if you are having problems. It's also a good idea to know your test results and keep a list of the medicines you take. Where can you learn more? Go to https://[employer].ru. org and sign in to your [email or web address] account. Enter J227 in the [employer] box to learn more about \"Sciatica: Exercises. \"     If you do not have an account, please click on the \"Sign Up Now\" link.  Current as of: November 16, 2020               Content Version: 12.8  © 1130-7088 [employer]. Care instructions adapted under license by [facility] ([facility]). If you have questions about a medical condition or this instruction, always ask your healthcare professional. [address] any warranty or liability for your use of this information.

## 2021-03-26 ENCOUNTER — IMMUNIZATION (OUTPATIENT)
Dept: PRIMARY CARE CLINIC | Age: 46
End: 2021-03-26
Payer: COMMERCIAL

## 2021-03-26 PROCEDURE — 91300 COVID-19, PFIZER VACCINE 30MCG/0.3ML DOSE: CPT | Performed by: PHYSICIAN ASSISTANT

## 2021-03-26 PROCEDURE — 0001A COVID-19, PFIZER VACCINE 30MCG/0.3ML DOSE: CPT | Performed by: PHYSICIAN ASSISTANT

## 2021-03-29 ENCOUNTER — HOSPITAL ENCOUNTER (OUTPATIENT)
Dept: MRI IMAGING | Age: 46
Discharge: HOME | End: 2021-03-31
Payer: COMMERCIAL

## 2021-03-29 DIAGNOSIS — M54.42 ACUTE BILATERAL LOW BACK PAIN WITH LEFT-SIDED SCIATICA: ICD-10-CM

## 2021-03-29 PROCEDURE — 72148 MRI LUMBAR SPINE W/O DYE: CPT

## 2021-04-01 ENCOUNTER — OFFICE VISIT (OUTPATIENT)
Dept: FAMILY MEDICINE CLINIC | Age: 46
End: 2021-04-01
Payer: COMMERCIAL

## 2021-04-01 VITALS
HEIGHT: 68 IN | DIASTOLIC BLOOD PRESSURE: 88 MMHG | OXYGEN SATURATION: 98 % | SYSTOLIC BLOOD PRESSURE: 124 MMHG | HEART RATE: 110 BPM | WEIGHT: 315 LBS | BODY MASS INDEX: 47.74 KG/M2 | RESPIRATION RATE: 17 BRPM | TEMPERATURE: 98.2 F

## 2021-04-01 DIAGNOSIS — M54.16 LUMBAR RADICULOPATHY: ICD-10-CM

## 2021-04-01 DIAGNOSIS — M54.42 ACUTE BILATERAL LOW BACK PAIN WITH LEFT-SIDED SCIATICA: Primary | ICD-10-CM

## 2021-04-01 PROCEDURE — 99214 OFFICE O/P EST MOD 30 MIN: CPT | Performed by: FAMILY MEDICINE

## 2021-04-01 RX ORDER — CYCLOBENZAPRINE HCL 5 MG
5 TABLET ORAL NIGHTLY PRN
Qty: 30 TABLET | Refills: 0 | Status: SHIPPED | OUTPATIENT
Start: 2021-04-01

## 2021-04-01 NOTE — PATIENT INSTRUCTIONS
Patient Education        Sciatica: Exercises  Introduction  Here are some examples of typical rehabilitation exercises for your condition. Start each exercise slowly. Ease off the exercise if you start to have pain. Your doctor or physical therapist will tell you when you can start these exercises and which ones will work best for you. When you are not being active, find a comfortable position for rest. Some people are comfortable on the floor or a medium-firm bed with a small pillow under their head and another under their knees. Some people prefer to lie on their side with a pillow between their knees. Don't stay in one position for too long. Take short walks (10 to 20 minutes) every 2 to 3 hours. Avoid slopes, hills, and stairs until you feel better. Walk only distances you can manage without pain, especially leg pain. How to do the exercises  Back stretches   1. Get down on your hands and knees on the floor. 2. Relax your head and allow it to droop. Round your back up toward the ceiling until you feel a nice stretch in your upper, middle, and lower back. Hold this stretch for as long as it feels comfortable, or about 15 to 30 seconds. 3. Return to the starting position with a flat back while you are on your hands and knees. 4. Let your back sway by pressing your stomach toward the floor. Lift your buttocks toward the ceiling. 5. Hold this position for 15 to 30 seconds. 6. Repeat 2 to 4 times. Follow-up care is a key part of your treatment and safety. Be sure to make and go to all appointments, and call your doctor if you are having problems. It's also a good idea to know your test results and keep a list of the medicines you take. Where can you learn more? Go to https://People Powersatya.[facility]. org and sign in to your Celles account. Enter I463 in the [employer] box to learn more about \"Sciatica: Exercises. \"     If you do not have an account, please click on the \"Sign Up Now\" link.  Current as of: November 16, 2020               Content Version: 12.8  © 3810-0029 [employer]. Care instructions adapted under license by [facility] ([facility]). If you have questions about a medical condition or this instruction, always ask your healthcare professional. [address] any warranty or liability for your use of this information.

## 2021-04-01 NOTE — PROGRESS NOTES
Pain:  Patient is here today with complaints of lower back pain pain. This is a/an chronic problem. This has been going on for 2 month(s). Exacerbating factors include standing. Alleviating factors include none. Pain is dull in nature. 3/10. Pain is  radiating. This has happen to patient before. Imaging to date includes 3/29/21. Therapy to date includes about a week ago. Labs to date include 3/2/21. He has numbness in his left foot. He states that they quit paying for his physical therapy so he can't go to therapy anymore. He had an MRI that showed disc bulge with midline and left paracentral disc extrusion at L5-S1. Patient's past medical, surgical, social and/or family history reviewed, updated in chart, and are non-contributory (unless otherwise stated). Medications and allergies also reviewed and updated in chart. Review of Systems:  Constitutional:  No fever, no fatigue, no chills, no headaches, no weight change  Dermatology:  No rash, no mole, no dry or sensitive skin  ENT:  No cough, no sore throat, no sinus pain, no runny nose, no ear pain  Cardiology:  No chest pain, no palpitations, no leg edema, no shortness of breath, no PND  Gastroenterology:  No dysphagia, no abdominal pain, no nausea, no vomiting, no constipation, no diarrhea, no heartburn  Musculoskeletal:  No joint pain, no leg cramps, + back pain, no muscle aches + numbness left foot  Respiratory:  No shortness of breath, no orthopnea, no wheezing, no DILLON, no hemoptysis  Urology:  No blood in the urine, no urinary frequency, no urinary incontinence, no urinary urgency, no nocturia, no dysuria      Vitals:    04/01/21 1117   BP: 124/88   Site: Right Upper Arm   Position: Sitting   Cuff Size: Large Adult   Pulse: 110   Resp: 17   Temp: 98.2 °F (36.8 °C)   TempSrc: Infrared   SpO2: 98%   Weight: (!) 352 lb (159.7 kg)   Height: 5' 8\" (1.727 m)       Physical Exam  Vitals signs and nursing note reviewed.    Constitutional: Appearance: He is well-developed. HENT:      Head: Normocephalic and atraumatic. Right Ear: External ear normal.      Left Ear: External ear normal.      Nose: Nose normal.   Eyes:      Conjunctiva/sclera: Conjunctivae normal.      Pupils: Pupils are equal, round, and reactive to light. Neck:      Musculoskeletal: Normal range of motion and neck supple. Thyroid: No thyromegaly. Cardiovascular:      Rate and Rhythm: Normal rate and regular rhythm. Heart sounds: Normal heart sounds. Pulmonary:      Effort: Pulmonary effort is normal.      Breath sounds: Normal breath sounds. No wheezing. Abdominal:      General: Bowel sounds are normal.      Palpations: Abdomen is soft. Tenderness: There is no abdominal tenderness. Musculoskeletal:      Lumbar back: He exhibits decreased range of motion, tenderness, pain and spasm. Skin:     General: Skin is warm and dry. Findings: No rash. Neurological:      Mental Status: He is alert and oriented to person, place, and time. Deep Tendon Reflexes: Reflexes are normal and symmetric. Comments: Numbness of left foot   Psychiatric:         Behavior: Behavior normal.         Assessment/Plan:      Carlo Kumar was seen today for back pain. Diagnoses and all orders for this visit:    Acute bilateral low back pain with left-sided sciatica  -     Carolyn Branch DO. [facility], [place]  RICE therapy discussed in detail  Continue home exercises  Referral to PMR    Lumbar radiculopathy  -     Carolyn Branch DO. [facility], [place]  RICE therapy discussed in detail  Continue home exercises  Referral to PMR    As above. Call or go to ED immediately if symptoms worsen or persist.  Return if symptoms worsen or fail to improve. , or sooner if necessary.       Educational materials and/or home exercises printed for patient's review and were included in patient instructions on his/her After Visit Summary and given to patient at the end of visit. Counseled regarding above diagnosis, including possible risks and complications,  especially if left uncontrolled. Counseled regarding the possible side effects, risks, benefits and alternatives to treatment; patient and/or guardian verbalizes understanding, agrees, feels comfortable with and wishes to proceed with above treatment plan. Advised patient to call with any new medication issues, and read all Rx info from pharmacy to assure aware of all possible risks and side effects of medication before taking. Reviewed age and gender appropriate health screening exams and vaccinations. Advised patient regarding importance of keeping up with recommended health maintenance and to schedule as soon as possible if overdue, as this is important in assessing for undiagnosed pathology, especially cancer, as well as protecting against potentially harmful/life threatening disease. Patient and/or guardian verbalizes understanding and agrees with above counseling, assessment and plan. All questions answered. Ayanna Horowitz DO  4/1/2021    I have personally reviewed and updated the chief complaint, HPI, Past Medical, Family and Social History, as well as the above Review of Systems.

## 2021-04-01 NOTE — PROGRESS NOTES
[address] [facility]                Phone: [phone]   Fax: [phone]    To: Dr. Terese Glass      From: Larisa Best      Patient: Msi Bravo II       : 1975  Diagnosis:       Date: 2021  Treatment Diagnosis:  acute LBP with sciatica     Physical Therapy Discharge Note    Total Visits to date:   5 Cancels/No-shows to date:      Plan of Care/Treatment to date:  [x] Therapeutic Exercise    [x] Modalities:  [x] Therapeutic Activity     [] Ultrasound  [x] Electrical Stimulation  [] Gait Training      [] Cervical Traction   [] Lumbar Traction  [] Neuromuscular Re-education  [x] Cold/hotpack [] Iontophoresis  [x] Instruction in HEP      Other:  [] Manual Therapy       []    [] Aquatic Therapy       []                            Progress towards goals:  pt reached all stated functional LTG's for therapy while pain persisted across LB; I with HEP for home management of condition     Goal Status:  [] Achieved [x] Partially Achieved  [] Not Achieved     Patient Status: [] Continue per initial plan of Care     [] Patient now discharged     [] Additional visits requested, Please re-certify for additional visits:          Electronically signed by: BOBBY Sainz     If you have any questions or concerns, please don't hesitate to call.   Thank you for your referral.

## 2021-04-13 ENCOUNTER — OFFICE VISIT (OUTPATIENT)
Dept: PHYSICAL MEDICINE AND REHAB | Age: 46
End: 2021-04-13
Payer: COMMERCIAL

## 2021-04-13 VITALS
WEIGHT: 315 LBS | BODY MASS INDEX: 47.74 KG/M2 | SYSTOLIC BLOOD PRESSURE: 143 MMHG | HEIGHT: 68 IN | HEART RATE: 91 BPM | DIASTOLIC BLOOD PRESSURE: 92 MMHG

## 2021-04-13 DIAGNOSIS — G89.29 CHRONIC LEFT-SIDED LOW BACK PAIN WITH LEFT-SIDED SCIATICA: ICD-10-CM

## 2021-04-13 DIAGNOSIS — E66.01 OBESITY, MORBID, BMI 50 OR HIGHER (HCC): ICD-10-CM

## 2021-04-13 DIAGNOSIS — M54.42 CHRONIC LEFT-SIDED LOW BACK PAIN WITH LEFT-SIDED SCIATICA: ICD-10-CM

## 2021-04-13 DIAGNOSIS — M54.17 LUMBOSACRAL RADICULOPATHY AT S1: Primary | ICD-10-CM

## 2021-04-13 DIAGNOSIS — M51.36 BULGING LUMBAR DISC: ICD-10-CM

## 2021-04-13 PROCEDURE — 99244 OFF/OP CNSLTJ NEW/EST MOD 40: CPT | Performed by: PHYSICAL MEDICINE & REHABILITATION

## 2021-04-13 NOTE — PATIENT INSTRUCTIONS
Patient Education        Learning About Low Back Pain  What is low back pain? Low back pain is pain that can occur anywhere below the ribs and above the legs. It is very common. Almost everyone has it at one time or another. Low back pain can be:  · Acute. This is new pain that can last a few days to a few weeksat the most a few months. · Chronic. This pain can last for more than a few months. Sometimes it can last for years. What are some myths about low back pain? Here are some common myths about low back painand the facts:  Myth: \"I need to rest my back when I have back pain. \"   Fact: Staying active won't hurt you. It may help you get better faster. Myth: \"I need prescription pain medicine. \"   Fact: It's best to try to let time and being active heal your back. Opioid pain medicinessuch as hydrocodone or oxycodoneusually don't work any better than over-the-counter medicines like ibuprofen or naproxen. And opioids can cause serious problems like opioid use disorder or overdose. Moderate to severe opioid use disorder is sometimes called addiction. Myth: \"I need a test like an X-ray or an MRI to diagnose my low back pain. \"   Fact: Getting a test right away won't help you get better faster. And it could lead you down a treatment path you may not need, since most people get better on their own. What causes low back pain? In most cases, there isn't a clear cause. This can be frustrating, because your back hurts and there's no obvious reason. Your back pain can be caused by:  Overuse or muscle strain. This can happen from playing sports, lifting heavy things, or not being physically fit. A herniated disc. This is a problem with the cushion between the bones in your back. Arthritis. With age, you may have changes in your bones that can narrow the space around your nerves. Other causes. In rare cases, the cause is a serious illness like an infection or cancer.  But there are usually other symptoms too.  What are the symptoms? Back pain can come on quickly or over time. You may feel:  · Pain in your hips or buttock. · Leg pain, numbness, tingling, or weakness. When a nerve gets squeezedsuch as from a disc problem or arthritisyou may have symptoms in your leg or foot. You can even have leg symptoms from a back problem without having any pain in your back. · Pain that's sharp or dull, sometimes with stiffness or muscle spasms. It may be in one small area or over a broad area. But even bad pain doesn't mean that it's caused by something serious. How is low back pain diagnosed? A physical exam is the main way to diagnose low back pain. Your doctor may examine your back, check your nerves by testing your reflexes, and make sure that your muscles are strong. Your doctor also will ask questions about your back and overall health. Most people don't need any tests right away. Tests often don't show the reason for your pain. If your pain lasts more than 6 weeks or you have symptoms that your doctor is more concerned about, then your doctor may order tests. These may include an X-ray, a CT scan, or an MRI. Sometimes other tests such as a bone scan or nerve conduction test may be done. How is low back pain treated? Most acute low back pain gets better on its own within a few weeks, no matter what the cause. Time and doing usual activities are all that most people need to feel better. Using heat or ice and taking over-the-counter pain medicine also can help while your body heals. If you aren't getting better on your own or your pain is very bad, your doctor may recommend:  · Physical therapy. · Spinal manipulation, such as by a chiropractor. · Acupuncture. · Massage. · Injections of steroid medicine in your back (especially for pain that involves your legs). If you have chronic low back pain, treatment will help you understand and manage your pain. Treatment may include:  · Staying active.  This may Lumbar Epidural Steroid Injections  What is a lumbar epidural steroid injection? A lumbar epidural injection is a shot into the epidural spacethe area in your back around the spinal cord. The shot may help reduce pain, tingling, or numbness in your back, buttock, or leg. The shot may have a steroid to reduce pain and swelling and a local anesthetic to numb nerves. How is a lumbar epidural steroid injection done? The doctor may use an imaging test before or during your injection. This can be an MRI, a CT scan, or an X-ray. These tests can show where your nerve problems are. After finding the right spot, the doctor may inject a numbing medicine into the skin where you will get the steroid injection. Then he or she puts the needle for the steroid into the numbed area. You may feel some pressure. You could feel some stinging or burning during the injection. How long does an epidural steroid injection take? It takes about 10 to 15 minutes to get this injection. You will probably go home about 20 to 30 minutes after you get it. What can you expect after a lumbar epidural steroid injection? If your injection had local anesthetic and a steroid, your legs may feel heavy or numb right after. You will probably be able to walk. But you may need to be extra careful. Take care not to lose your balance, and be sure to follow your doctor's instructions. If your injection contained local anesthetic, you may feel better right away. But this pain relief will last only a few hours. Your pain will probably return. This is because the steroids have not started working yet. Before the steroids start to work, your back may be sore for a few days. These injections don't always work. When they do, it takes 1 to 5 days. This pain relief can last for several days to a few months or longer. You may want to do less than normal for a few days. But you may also be able to return to your daily routine.   Some people are dizzy or feel Your injection may also include a numbing medicine that will work right away for a short time. Some people get a series of injections over weeks or months. Follow-up care is a key part of your treatment and safety. Be sure to make and go to all appointments, and call your doctor if you are having problems. It's also a good idea to know your test results and keep a list of the medicines you take. How do you prepare for the procedure? Procedures can be stressful. This information will help you understand what you can expect. And it will help you safely prepare for your procedure. Preparing for the procedure    · Be sure you have someone to take you home. Anesthesia and pain medicine will make it unsafe for you to drive or get home on your own.     · Understand exactly what procedure is planned, along with the risks, benefits, and other options.     · If you take aspirin or some other blood thinner, ask your doctor if you should stop taking it before your procedure. Make sure that you understand exactly what your doctor wants you to do. These medicines increase the risk of bleeding.     · Tell your doctor ALL the medicines, vitamins, supplements, and herbal remedies you take. Some may increase the risk of problems during your procedure. Your doctor will tell you if you should stop taking any of them before the procedure and how soon to do it.     · Make sure your doctor and the hospital have a copy of your advance directive. If you don't have one, you may want to prepare one. It lets others know your health care wishes. It's a good thing to have before any type of surgery or procedure. What happens on the day of the procedure?    · Your doctor may tell you not to eat or drink for a certain amount of time before the procedure. Follow these instructions carefully.     · Take a bath or shower before you come in for your procedure. Do not apply lotions, perfumes, deodorants, or nail polish.    At the hospital or surgery center   · Bring a picture ID.     · You may get medicine that relaxes you or puts you in a light sleep. The area being worked on will be numb.     · The procedure will take 5 to 15 minutes. You will go home about an hour later. When should you call your doctor? · You have questions or concerns.     · You don't understand how to prepare for your procedure.     · You become ill before the procedure (such as fever, flu, or a cold).     · You need to reschedule or have changed your mind about having the procedure. Where can you learn more? Go to https://[employer]. org and sign in to your StreamOcean account. Enter G658 in the Cloud[employer]ashing box to learn more about \"Lumbar Epidural Steroid Injection: Before Your Procedure. \"     If you do not have an account, please click on the \"Sign Up Now\" link. Current as of: November 16, 2020               Content Version: 12.8  © 2006-2021 [employer]. Care instructions adapted under license by [facility] ([facility]). If you have questions about a medical condition or this instruction, always ask your healthcare professional. [place] [address] any warranty or liability for your use of this information. Patient Education        Lumbar Epidural Steroid Injection: What to Expect at [address] [facility]  During your lumbar epidural injection, your doctor injected steroid medicine into the area around your spinal cord to help with pain, tingling, or numbness. Steroids don't always work. And when they do, it takes a few days. But the pain relief can last for several days to a few months or longer. Your injection may also have included a numbing medicine that works right away for a short time. It may leave your legs feeling heavy or numb at first. You will probably be able to walk. But you may need to be extra careful. The effects of the numbing medicine should wear off in a few hours.   This care sheet gives you a   · You have a severe headache.     · You have new loss of bowel or bladder control.     · You have signs of infection, such as:  ? Increased pain, swelling, warmth, or redness. ? A fever. Watch closely for any changes in your health, and be sure to contact your doctor if:    · You do not get better as expected. Where can you learn more? Go to https://Brevitypepiceweb.Hub[employer]ub. org and sign in to your mSnap account. Enter C926 in the [employer] box to learn more about \"Lumbar Epidural Steroid Injection: What to Expect at Home. \"     If you do not have an account, please click on the \"Sign Up Now\" link. Current as of: November 16, 2020               Content Version: 12.8  © 2006-2021 [employer]. Care instructions adapted under license by [facility] ([facility]). If you have questions about a medical condition or this instruction, always ask your healthcare professional. [place] [address] any warranty or liability for your use of this information.

## 2021-04-13 NOTE — PROGRESS NOTES
Daughter    Works as a . Allergies   Allergen Reactions    Penicillins Rash     Current Outpatient Medications   Medication Sig Dispense Refill    cyclobenzaprine (FLEXERIL) 5 MG tablet Take 1 tablet by mouth nightly as needed for Muscle spasms 30 tablet 0    diclofenac (VOLTAREN) 50 MG EC tablet Take 1 tablet by mouth 3 times daily (with meals) 90 tablet 3     No current facility-administered medications for this visit. Review of Systems - For review of systems, positive symptoms are underlined and negative findings are not underlined. General: chills, fatigue, fever, malaise, night sweats, weight gain,  weight loss. Psychological: anxiety, depression, suicidal ideation, sleep disturbances, behavioral disorder, difficulty concentrating, disorientation, hallucinations, mood swings, obsessive thoughts, physical abuse,  sexual abuse. Ophthalmic: blurry vision, decreased vision, double vision, loss of vision, photophobia, use of corrective device. Ear Nose Throat: hearing loss, tinnitus, phonophobia, sensitivity to smells, vertigo, or vocal changes. Allergy/Immunology: seasonal allergies, watery eyes, itchy eyes, frequent infections. Hematological and Lymphatic: bleeding problems, blood clots, bruising,  yellowing of the skin, swollen lymph nodes. Endocrine:  polydypsia, polyuria, temperature intolerance. Respiratory: cough, shortness of breath, wheezing. Cardiovascular: syncope, chest pain, dyspnea on exertion, edema, irregular heartbeat,  palpitations. Gastrointestinal: abdominal pain, constipation, diarrhea,  decreased appetite, heartburn, hematemesis, melena, nausea, vomiting, stool incontinence, abnormal swallowing. Genito-Urinary: dysuria, hematuria, incontinence, frequency, urgency. Musculoskeletal: joint pain, stiffness, swelling, muscle pain, muscle  tenderness.  Neurological: confusion, memory loss, dizziness, gait disturbance, headaches, impaired coordination, decreased balance, numbness/tingling, seizures, speech problems, tremors,weakness. Dermatological:  hair changes, nail changes, pruritus, rash. Physical Exam: Blood pressure (!) 143/92, pulse 91, height 5' 8\" (1.727 m), weight (!) 350 lb (158.8 kg). General: The patient is in no apparent distress. Body habitus is obese. HEENT: No rhinorrhea, sneezing, yawning, or lacrimation. No scleral icterus or conjunctival injection. SKIN: No piloerection. No track marks. No rash. Normal turgor. No erythema or ecchymosis. Psychological: Mood and affect are appropriate. Hygiene is appropriate. Cardiovascular:  Heart is regular rate and rhythm. Peripheral pulses are 2+ at the dorsalis pedis, posterior tibial and radial arteries. There is no edema. Respiratory: Respirations are regular and unlabored. There is no cyanosis. Lymphatic: There is no cervical or inguinal lymphadenopathy. Gastrointestinal: Soft abdomen, non-tender. No pulsating abdominal mass. Genitourinary: No costovertebral angle tenderness. MSK: Lumbar:  Cervical lordosis increased,  thoracic kyphosis normal and lumbar lordosis increased. No hairy patch, cafe au lait, nevi, hemangioma or dimpling over lumbar area. Pelvis level, no scoliosis, leg length equal. Seated and standing flexion tests negative. There is no step off deformity. No superficial or bony tenderness. Lumbar AROM in flexion is 60 degrees, in extension is 20 degrees, in left rotation is 20degrees, in right rotation is 20 degrees, in left lateral flexion is 20 degrees and in right lateral flexion is 20 degrees. There is no tenderness to palpation at bilateral lumbosacral paraspinal muscles,  SIJ,  gluteal muscles,  pubic tubercle,  PSIS,  ischial tuberosity,  greater trochanter,  ASIS,  iliac crest.  No trigger points. No spasm. No edema, erythema, ecchymosis,  mass or deformity. Taut bands absent. Popliteal angle is normal. Seated straight leg raise positive on left. SIJ: Gillet negative bilaterally.   TABITHA negative bilaterally. ASIS compression test negative bilaterally. Hip: Full painless AROM bilaterally. Fito negative bilaterally. Trendelenburg negative bilaterally. Neurologic: Awake, alert and oriented in three planes. Speech is fluent. No facial weakness. Tongue is midline. Hearing is intact for conversation. Pupils are equal and round. Extraocular muscles are intact. Hearing is intact for conversation. Shoulder shrug symmetric. Sensation: Diminished left posterior thigh and calf to light touch, otherwise, intact for light touch and pin prick in all upper and lower extremity dermatomes. Vibration and proprioception are intact at the bilateral first MTP. Strength: 5/5 in all myotomes in the upper and lower extremities. Muscle Tendon Reflexes: 2+ symmetric in the bilateral upper and lower extremities except left achilles is absent. Babinski is downgoing bilaterally. Alina Davis is negative bilaterally. Gait is Antalgic. Romberg is negative. Heel and toe walk are normal.  Tandem walk is normal.  No clonus or spasticity. The patient was able to rise from a chair and squat without difficulty. There is no tremor. Impression:   1. Lumbosacral radiculopathy at S1    2. Bulging lumbar disc    3. Chronic left-sided low back pain with left-sided sciatica    4. Obesity, morbid, BMI 50 or higher ([facility] [address]        Plan:   Orders Placed This Encounter   Procedures    Ambulatory epidural steroid injection     Scheduling Instructions:      Left S1 Transforaminal Epidural steroid injection by . Dr. Vicente Duty will update H&P if I haven't seen them in the last 30 days at the time of the injection.  Continue home exercises and current medications   The patient was educated about the diagnosis, prognosis, indications, risks and benefits of treatment. An opportunity to ask questions was given to the patient and questions were answered.   The patient agreed to proceed with the recommended treatment as described above.  Follow up after ARNOLDO. Thank you for the consultation and for allowing me to participate in the care of this patient. Sincerely,         Fortunato Cotton D.O., P.T.   Board Certified Physical Medicine and Rehabilitation  Board Certified Electrodiagnostic Medicine

## 2021-04-20 ENCOUNTER — PREP FOR PROCEDURE (OUTPATIENT)
Dept: PHYSICAL MEDICINE AND REHAB | Age: 46
End: 2021-04-20

## 2021-04-22 ENCOUNTER — HOSPITAL ENCOUNTER (OUTPATIENT)
Dept: OPERATING ROOM | Age: 46
Setting detail: OUTPATIENT SURGERY
Discharge: HOME | End: 2021-04-22
Attending: PHYSICAL MEDICINE & REHABILITATION
Payer: COMMERCIAL

## 2021-04-22 ENCOUNTER — IMMUNIZATION (OUTPATIENT)
Dept: PRIMARY CARE CLINIC | Age: 46
End: 2021-04-22
Payer: COMMERCIAL

## 2021-04-22 ENCOUNTER — HOSPITAL ENCOUNTER (OUTPATIENT)
Age: 46
Setting detail: OUTPATIENT SURGERY
Discharge: HOME | End: 2021-04-22
Attending: PHYSICAL MEDICINE & REHABILITATION | Admitting: PHYSICAL MEDICINE & REHABILITATION
Payer: COMMERCIAL

## 2021-04-22 VITALS
WEIGHT: 315 LBS | OXYGEN SATURATION: 94 % | HEIGHT: 68 IN | HEART RATE: 78 BPM | RESPIRATION RATE: 14 BRPM | DIASTOLIC BLOOD PRESSURE: 67 MMHG | BODY MASS INDEX: 47.74 KG/M2 | SYSTOLIC BLOOD PRESSURE: 135 MMHG

## 2021-04-22 DIAGNOSIS — M51.9 LUMBAR DISC DISEASE: ICD-10-CM

## 2021-04-22 PROBLEM — M54.17 LUMBOSACRAL RADICULITIS: Status: ACTIVE | Noted: 2021-04-22

## 2021-04-22 PROCEDURE — 7100000011 HC PHASE II RECOVERY - ADDTL 15 MIN: Performed by: PHYSICAL MEDICINE & REHABILITATION

## 2021-04-22 PROCEDURE — 2580000003 HC RX 258: Performed by: PHYSICAL MEDICINE & REHABILITATION

## 2021-04-22 PROCEDURE — 64483 NJX AA&/STRD TFRM EPI L/S 1: CPT | Performed by: PHYSICAL MEDICINE & REHABILITATION

## 2021-04-22 PROCEDURE — 6360000004 HC RX CONTRAST MEDICATION: Performed by: PHYSICAL MEDICINE & REHABILITATION

## 2021-04-22 PROCEDURE — 0002A COVID-19, PFIZER VACCINE 30MCG/0.3ML DOSE: CPT | Performed by: NURSE PRACTITIONER

## 2021-04-22 PROCEDURE — 3600000005 HC SURGERY LEVEL 5 BASE: Performed by: PHYSICAL MEDICINE & REHABILITATION

## 2021-04-22 PROCEDURE — 3209999900 FLUORO FOR SURGICAL PROCEDURES

## 2021-04-22 PROCEDURE — 91300 COVID-19, PFIZER VACCINE 30MCG/0.3ML DOSE: CPT | Performed by: NURSE PRACTITIONER

## 2021-04-22 PROCEDURE — 7100000010 HC PHASE II RECOVERY - FIRST 15 MIN: Performed by: PHYSICAL MEDICINE & REHABILITATION

## 2021-04-22 PROCEDURE — 2709999900 HC NON-CHARGEABLE SUPPLY: Performed by: PHYSICAL MEDICINE & REHABILITATION

## 2021-04-22 PROCEDURE — 6360000002 HC RX W HCPCS: Performed by: PHYSICAL MEDICINE & REHABILITATION

## 2021-04-22 PROCEDURE — 2500000003 HC RX 250 WO HCPCS: Performed by: PHYSICAL MEDICINE & REHABILITATION

## 2021-04-22 RX ORDER — LIDOCAINE HYDROCHLORIDE 10 MG/ML
INJECTION, SOLUTION EPIDURAL; INFILTRATION; INTRACAUDAL; PERINEURAL PRN
Status: DISCONTINUED | OUTPATIENT
Start: 2021-04-22 | End: 2021-04-22 | Stop reason: ALTCHOICE

## 2021-04-22 ASSESSMENT — PAIN - FUNCTIONAL ASSESSMENT: PAIN_FUNCTIONAL_ASSESSMENT: 0-10

## 2021-04-22 ASSESSMENT — PAIN SCALES - GENERAL
PAINLEVEL_OUTOF10: 0
PAINLEVEL_OUTOF10: 0

## 2021-04-22 NOTE — H&P
[place], [address] [facility]  [address] [facility] [place]  Phone: [phone]  Fax: [phone]    PCP: Jm Mendoza DO  Date of visit: 4/22/2021    CC: low back and leg pain       Narda Temple II is a 39 y.o. male who presents today for epidural steroid injection. Patient complains of low back and leg pain. No red flag symptoms. Consents to proceed with procedure. Allergies   Allergen Reactions    Penicillins Rash       No current facility-administered medications for this encounter. No past medical history on file. Past Surgical History:   Procedure Laterality Date    WISDOM TOOTH EXTRACTION         Family History   Problem Relation Age of Onset    Colon Cancer Mother 78    Cancer Father         multiple myoloma    No Known Problems Sister     Diabetes Brother     No Known Problems Brother     No Known Problems Sister     No Known Problems Sister     No Known Problems Sister     No Known Problems Sister     Diabetes Sister     Other Sister         pulmonary HTN    No Known Problems Son     No Known Problems Daughter             ROS:    Constitutional: Denies fevers, chills, night sweats, unintentional weight loss     Skin: Denies rash or skin changes     Respiratory: Denies SOB or cough     Cardiovascular: Denies CP, palpitations, edema      Neurologic: See HPI.     MSK: See HPI. Hematologic/Lymphatic/Immunologic: Denies bruising       Physical Exam:   Blood pressure (!) 146/63, pulse 92, resp. rate 16, height 5' 8\" (1.727 m), weight (!) 350 lb (158.8 kg), SpO2 95 %. General: well developed and well nourished in no acute distress  Resp: symmetrical chest expansion, unlabored breathing, respirations unlabored. CV: Heart rate is regular. Peripheral pulses are palpable  Skin: No rashes or ecchymosis. Normal turgor. MSK: ttp lumbar psps     Neurological Exam:  No focal sensorimotor deficits. Reflexes 2+ and symmetric.        Impression:     Lumbosacral radiculitis      Plan:   · Injection as planned today           Wilbur Turner DO, [address] [place] [address]   Board Certified Physical Medicine and Rehabilitation

## 2021-04-22 NOTE — OP NOTE
LUMBOSACRAL EPIDURAL STEROID INJECTION, TRANSFORAMINAL APPROACH       WITH FLUOROSCOPIC GUIDANCE    Patient: Abdi Lundberg II                         MRN#: [id number]  : 1975   Date of procedure: 2021      Physician Performing Procedure:  Kurt Sanchez DO    Clinical Scenario: As per electronic documentation. Diagnosis: lumbosacral radiculitis    Injectate: A total of 3cc, consisting of 1 cc of Dexamethasone 10mg/ml,  with the remainder of normal saline    Levels Treated:  Left S1 neural foramen    Approach:   Transforaminal    Improvement after today's procedure: As per nursing record. Comments:  None     Pre-procedural evaluation: The patient was examined today just prior to performing the procedure listed above. The patient's heart rate was normal, lungs were clear to auscultation bilaterally. Procedure: The patient was prepped and draped in a sterile fashion in the prone position after informed consent was signed and all the patient's questions were answered including the risks, benefits, alternative treatment options, and prognosis. The risks include - but are not limited to - infection, allergic reaction, increased pain, lack of therapeutic benefit, steroid reaction, nerve damage, paralysis, stroke, epidural hematoma, syncope, headache, respiratory or cardiac arrest, and scar formation. The C-arm was positioned so that an oblique view of the neural foramen as noted above was visualized. The soft tissues overlying this structure were infiltrated with 2-3 cc. of 1% Lidocaine without Epinephrine. A 22 gauge 5 inch spinal needle was inserted toward the target using a trajectory view along the fluoroscope beam.  Under AP and lateral visualization, the needle was advanced so it did not puncture dura. Biplanar projections were used to confirm position. Aspiration was confirmed to be negative for CSF and/or blood. A 1-2 cc. volume of Isovue contrast was injected at this level. The contrast was observed to flow under the pedicle. Radiographs were obtained for documentation purposes. After attaining flow of contrast documented above, the above injectate was administered into the transforaminal epidural space. The patient tolerated the procedure well and was discharged after an appropriate period of observation. If there are any complications, the patient was instructed to call us. The patient is to follow-up with the requesting physician after the injection, preferably within three weeks.

## 2021-07-13 ENCOUNTER — OFFICE VISIT (OUTPATIENT)
Dept: FAMILY MEDICINE CLINIC | Age: 46
End: 2021-07-13
Payer: COMMERCIAL

## 2021-07-13 VITALS
HEART RATE: 98 BPM | WEIGHT: 315 LBS | TEMPERATURE: 98.5 F | OXYGEN SATURATION: 98 % | BODY MASS INDEX: 47.74 KG/M2 | SYSTOLIC BLOOD PRESSURE: 124 MMHG | HEIGHT: 68 IN | RESPIRATION RATE: 18 BRPM | DIASTOLIC BLOOD PRESSURE: 82 MMHG

## 2021-07-13 DIAGNOSIS — L24.9 IRRITANT CONTACT DERMATITIS, UNSPECIFIED TRIGGER: Primary | ICD-10-CM

## 2021-07-13 DIAGNOSIS — K04.7 DENTAL INFECTION: ICD-10-CM

## 2021-07-13 PROCEDURE — 99213 OFFICE O/P EST LOW 20 MIN: CPT | Performed by: FAMILY MEDICINE

## 2021-07-13 RX ORDER — CLINDAMYCIN HYDROCHLORIDE 300 MG/1
300 CAPSULE ORAL 3 TIMES DAILY
Qty: 21 CAPSULE | Refills: 0 | Status: SHIPPED | OUTPATIENT
Start: 2021-07-13 | End: 2021-07-20

## 2021-07-13 RX ORDER — TRIAMCINOLONE ACETONIDE 0.25 MG/G
CREAM TOPICAL
Qty: 1 TUBE | Refills: 1 | Status: SHIPPED
Start: 2021-07-13 | End: 2021-07-20 | Stop reason: SDUPTHER

## 2021-07-13 NOTE — PROGRESS NOTES
Rash:  Patient is here today with complaints of a rash. This has been going on for 5 day(s). Rash is located on right side of his abdomen. It is not spreading. Exacerbating factors include everything. Alleviating factors include shower. Associated signs and symptoms include itching and painful. Patient has not changed hygiene products. Patient does not have pets. This has not happened to patient in the past.  Patient has not had recent travel. He has tried cortisone and other over the counter creams to treat poison ivy with no improvement. No contacts with a similar rash. He denies any drainage or discharge from the rash. Patient is having dental issues. He states he has pain and cannot get in with the dentist for 2 weeks. He is concerned about cavities and possible infection. Patient's past medical, surgical, social and/or family history reviewed, updated in chart, and are non-contributory (unless otherwise stated). Medications and allergies also reviewed and updated in chart.       Review of Systems:  Constitutional:  No fever, no fatigue, no chills, no headaches, no weight change  Dermatology:  + rash, no mole, no dry or sensitive skin  ENT:  No cough, no sore throat, no sinus pain, no runny nose, no ear pain  Cardiology:  No chest pain, no palpitations, no leg edema, no shortness of breath, no PND  Gastroenterology:  No dysphagia, no abdominal pain, no nausea, no vomiting, no constipation, no diarrhea, no heartburn  Musculoskeletal:  No joint pain, no leg cramps, no back pain, no muscle aches  Respiratory:  No shortness of breath, no orthopnea, no wheezing, no DILLON, no hemoptysis  Urology:  No blood in the urine, no urinary frequency, no urinary incontinence, no urinary urgency, no nocturia, no dysuria      Vitals:    07/13/21 1243 07/13/21 1245 07/13/21 1306   BP: (!) 154/102 (!) 154/88 124/82   Pulse: 98     Resp: 18     Temp: 98.5 °F (36.9 °C)     SpO2: 98%     Weight: (!) 353 lb (160.1 kg)     Height: 5' 8\" (1.727 m)         Physical Exam  Vitals and nursing note reviewed. Constitutional:       Appearance: He is well-developed. HENT:      Head: Normocephalic and atraumatic. Right Ear: External ear normal.      Left Ear: External ear normal.      Nose: Nose normal.      Mouth/Throat:      Dentition: Abnormal dentition. Dental caries present. Eyes:      Conjunctiva/sclera: Conjunctivae normal.      Pupils: Pupils are equal, round, and reactive to light. Neck:      Thyroid: No thyromegaly. Cardiovascular:      Rate and Rhythm: Normal rate and regular rhythm. Heart sounds: Normal heart sounds. Pulmonary:      Effort: Pulmonary effort is normal.      Breath sounds: Normal breath sounds. No wheezing. Abdominal:      General: Bowel sounds are normal.      Palpations: Abdomen is soft. Tenderness: There is no abdominal tenderness. Musculoskeletal:         General: Normal range of motion. Cervical back: Normal range of motion and neck supple. Skin:     General: Skin is warm and dry. Findings: Rash present. Rash is urticarial (right side of abdomen, left axillary region). Neurological:      Mental Status: He is alert and oriented to person, place, and time. Deep Tendon Reflexes: Reflexes are normal and symmetric. Psychiatric:         Behavior: Behavior normal.         Assessment/Plan:      Annalise Garcia was seen today for rash. Diagnoses and all orders for this visit:    Irritant contact dermatitis, unspecified trigger  -     triamcinolone (KENALOG) 0.025 % cream; Apply topically 2 times daily. Dental infection  -     clindamycin (CLEOCIN) 300 MG capsule; Take 1 capsule by mouth 3 times daily for 7 days      As above. Call or go to ED immediately if symptoms worsen or persist.  Return in about 2 weeks (around 7/27/2021) for rash. , or sooner if necessary.       Educational materials and/or home exercises printed for patient's review and were included in

## 2021-07-13 NOTE — PATIENT INSTRUCTIONS
Patient Education        triamcinolone topical  Pronunciation:  Fareed Ratliff am SIN oh lone  Brand:  DermasilkRx SDS Antony, Dermasorb TA, DermaWerx SDS Antony, [place], Pedralva, [place], Triderm  What is the most important information I should know about triamcinolone topical?  Follow all directions on your medicine label and package. Tell each of your healthcare providers about all your medical conditions, allergies, and all medicines you use. What is triamcinolone topical?  Triamcinolone is a potent steroid that helps reduce inflammation in the body. Triamcinolone topical (for the skin) is used to treat the inflammation and itching caused by skin conditions that respond to steroid medication. The dental paste form of triamcinolone topical is used to treat mouth ulcers. Triamcinolone topical may also be used for purposes not listed in this medication guide. What should I discuss with my healthcare provider before using triamcinolone topical?  You should not use triamcinolone if you are allergic to it. Tell your doctor if you have ever had:  · any type of skin infection;  · a skin reaction to any steroid medicine;  · liver disease; or  · an adrenal gland disorder. Steroid medicines can increase the glucose (sugar) levels in your blood or urine. Tell your doctor if you have diabetes. Tell your doctor if you are pregnant or breastfeeding. If you apply triamcinolone to your chest, avoid areas that may come into contact with the nursing baby's mouth. How should I use triamcinolone topical?  Follow all directions on your prescription label and read all medication guides or instruction sheets. Use the medicine exactly as directed. Triamcinolone topical cream, lotion, ointment, or spray is for use only on the skin. Triamcinolone dental paste is applied directly onto an ulcer inside the mouth and left in place. Do not swallow this medicine.     Wash your hands before and after using triamcinolone topical, unless you are using this medicine to treat the skin on your hands. Apply a thin layer of medicine to the affected skin. Do not apply this medicine over a large area of skin unless your doctor has told you to. Do not cover the treated skin area with a bandage or other covering unless your doctor tells you to. Covering treated areas can increase the amount of medicine absorbed through your skin and may cause harmful effects. If you are treating the diaper area, do not use plastic pants or tight-fitting diapers. To use the dental paste, press a small dab onto the mouth ulcer but do not rub in the medicine. The dab will form a thin film that should be left in place for several hours. Triamcinolone dental paste is usually applied at bedtime and/or after meals. Follow your doctor's instructions. Call your doctor if your symptoms do not improve, or if they get worse. A mouth ulcer should improve within 1 week of using triamcinolone dental paste. You should stop using this medicine once your symptoms are controlled. Store at room temperature away from moisture and heat. What happens if I miss a dose? Apply the medicine as soon as you can, but skip the missed dose if it is almost time for your next dose. Do not apply two doses at one time. What happens if I overdose? Seek emergency medical attention or call the Poison Help line at 1-202.114.8526 if anyone has accidentally swallowed the medication. High doses or long-term use of steroid medicine can lead to thinning skin, easy bruising, changes in body fat (especially in your face, neck, back, and waist), increased acne or facial hair, menstrual problems, impotence, or loss of interest in sex. What should I avoid while using triamcinolone topical?  Avoid getting this medicine in your eyes. Avoid using other topical steroid medications on the areas you treat with triamcinolone unless your doctor tells you to.   Do not use triamcinolone topical to treat any condition that has not been checked by your doctor. Do not share this medicine with another person, even if they have the same symptoms you have. What are the possible side effects of triamcinolone topical?  Get emergency medical help if you have signs of an allergic reaction: hives; difficult breathing; swelling of your face, lips, tongue, or throat. Call your doctor at once if you have:  · worsening of your skin condition;  · redness, warmth, swelling, oozing, or severe irritation of any treated skin;  · blurred vision, tunnel vision, eye pain, or seeing halos around lights;  · high blood sugar --increased thirst, increased urination, dry mouth, fruity breath odor; or  · possible signs of absorbing this medicine through your skin or gums --weight gain (especially in your face or your upper back and torso), slow wound healing, thinning or discolored skin, increased body hair, muscle weakness, nausea, diarrhea, tiredness, mood changes, menstrual changes, sexual changes. Children can absorb larger amounts of this medicine through the skin and may be more likely to have side effects such as growth delay, headaches, or pain behind the eyes. A baby using this medicine may have a bulging soft spot (the top of the head where the skull hasn't yet grown together). Common side effects may include:  · burning, itching, dryness, or other irritation of treated skin;  · redness or crusting around your hair follicles;  · redness or itching around your mouth;  · allergic skin reaction;  · stretch marks;  · acne, increased body hair growth;  · thinning skin or discoloration; or  · white or \"pruned\" appearance of the skin (caused by covering treated skin with a tight bandage or other covering). This is not a complete list of side effects and others may occur. Call your doctor for medical advice about side effects. You may report side effects to FDA at 7-116-FDA-5545.   What other drugs will affect triamcinolone topical?  Medicine used on the skin is not likely to be affected by other drugs you use. But many drugs can interact with each other. Tell each of your healthcare providers about all medicines you use, including prescription and over-the-counter medicines, vitamins, and herbal products. Where can I get more information? Your pharmacist can provide more information about triamcinolone topical.  Remember, keep this and all other medicines out of the reach of children, never share your medicines with others, and use this medication only for the indication prescribed. Every effort has been made to ensure that the information provided by [facility] is accurate, up-to-date, and complete, but no guarantee is made to that effect. Drug information contained herein may be time sensitive. [facility] information has been compiled for use by healthcare practitioners and consumers in the [place] and therefore [facility] does not warrant that uses outside of the [place] are appropriate, unless specifically indicated otherwise. [facility]'s drug information does not endorse drugs, diagnose patients or recommend therapy. [facility] drug information is an informational resource designed to assist licensed healthcare practitioners in caring for their patients and/or to serve consumers viewing this service as a supplement to, and not a substitute for, the expertise, skill, knowledge and judgment of healthcare practitioners. The absence of a warning for a given drug or drug combination in no way should be construed to indicate that the drug or drug combination is safe, effective or appropriate for any given patient. [facility] does not assume any responsibility for any aspect of healthcare administered with the aid of information [facility] provides. The information contained herein is not intended to cover all possible uses, directions, precautions, warnings, drug interactions, allergic reactions, or adverse effects.  If you have questions about the drugs you are taking, check with your doctor, nurse or pharmacist.  Copyright 9201-1558 [address] [place]. Version: 8.02. Revision date: 12/27/2019. Care instructions adapted under license by [facility] [facility]). If you have questions about a medical condition or this instruction, always ask your healthcare professional. [place] [address] any warranty or liability for your use of this information.

## 2021-07-20 DIAGNOSIS — L24.9 IRRITANT CONTACT DERMATITIS, UNSPECIFIED TRIGGER: ICD-10-CM

## 2021-07-20 RX ORDER — TRIAMCINOLONE ACETONIDE 0.25 MG/G
CREAM TOPICAL
Qty: 1 TUBE | Refills: 1 | Status: SHIPPED | OUTPATIENT
Start: 2021-07-20

## 2022-03-15 ENCOUNTER — OFFICE VISIT (OUTPATIENT)
Dept: FAMILY MEDICINE CLINIC | Age: 47
End: 2022-03-15
Payer: COMMERCIAL

## 2022-03-15 VITALS
DIASTOLIC BLOOD PRESSURE: 70 MMHG | RESPIRATION RATE: 18 BRPM | HEIGHT: 68 IN | HEART RATE: 89 BPM | BODY MASS INDEX: 47.74 KG/M2 | OXYGEN SATURATION: 97 % | TEMPERATURE: 98.1 F | WEIGHT: 315 LBS | SYSTOLIC BLOOD PRESSURE: 120 MMHG

## 2022-03-15 DIAGNOSIS — G47.10 HYPERSOMNIA: ICD-10-CM

## 2022-03-15 DIAGNOSIS — M54.42 ACUTE BILATERAL LOW BACK PAIN WITH LEFT-SIDED SCIATICA: Primary | ICD-10-CM

## 2022-03-15 DIAGNOSIS — R06.83 SNORING: ICD-10-CM

## 2022-03-15 PROCEDURE — 99214 OFFICE O/P EST MOD 30 MIN: CPT | Performed by: FAMILY MEDICINE

## 2022-03-15 RX ORDER — METHYLPREDNISOLONE 4 MG/1
TABLET ORAL
Qty: 1 KIT | Refills: 0 | Status: SHIPPED | OUTPATIENT
Start: 2022-03-15 | End: 2022-03-21

## 2022-03-15 SDOH — ECONOMIC STABILITY: FOOD INSECURITY: WITHIN THE PAST 12 MONTHS, THE FOOD YOU BOUGHT JUST DIDN'T LAST AND YOU DIDN'T HAVE MONEY TO GET MORE.: NEVER TRUE

## 2022-03-15 SDOH — ECONOMIC STABILITY: FOOD INSECURITY: WITHIN THE PAST 12 MONTHS, YOU WORRIED THAT YOUR FOOD WOULD RUN OUT BEFORE YOU GOT MONEY TO BUY MORE.: NEVER TRUE

## 2022-03-15 ASSESSMENT — PATIENT HEALTH QUESTIONNAIRE - PHQ9
SUM OF ALL RESPONSES TO PHQ QUESTIONS 1-9: 0
SUM OF ALL RESPONSES TO PHQ9 QUESTIONS 1 & 2: 0
1. LITTLE INTEREST OR PLEASURE IN DOING THINGS: 0
SUM OF ALL RESPONSES TO PHQ QUESTIONS 1-9: 0
2. FEELING DOWN, DEPRESSED OR HOPELESS: 0

## 2022-03-15 ASSESSMENT — SOCIAL DETERMINANTS OF HEALTH (SDOH): HOW HARD IS IT FOR YOU TO PAY FOR THE VERY BASICS LIKE FOOD, HOUSING, MEDICAL CARE, AND HEATING?: NOT HARD AT ALL

## 2022-03-15 ASSESSMENT — LIFESTYLE VARIABLES: HOW OFTEN DO YOU HAVE A DRINK CONTAINING ALCOHOL: MONTHLY OR LESS

## 2022-03-15 NOTE — PATIENT INSTRUCTIONS
Patient Education        Sleep Studies: About This Test  What is it? Sleep studies are tests that watch what happens to your body during sleep. These studies usually are done in a sleep lab. Sleep labs are often located in hospitals. Sleep studies you do at home can be done with portable equipment. But they may not give the same results as a sleep lab. Why is this test done? Sleep studies may be done if your sleep is not restful or if you are tired all day. These studies can help find sleep problems, such as:  · Sleep apnea. · Excessive snoring. · Problems staying awake, such as narcolepsy. · Problems with nighttime behaviors. These include sleepwalking, night terrors, bed-wetting, and REM behavior disorders (RBD). · Conditions such as periodic limb movement disorder. This is repeated muscle twitching of the feet, arms, or legs during sleep. · Seizures that occur at night (nocturnal seizures). · Ongoing problems that have not responded to treatment. How do you prepare for the test?  · You may be asked to keep a sleep diary before your sleep study. · Don't take any naps on the day of your test.  · You may be asked to avoid food or drinks with caffeine during the afternoon and evening before your test.  · If the sleep study will be done in a sleep lab, you will be asked to shower or bathe before your test. Don't use sprays, oils, or gels on your hair. Don't wear makeup, fingernail polish, or fake nails. Take a small overnight bag with personal items, such as a toothbrush, a comb, favorite pillows or blankets, and a book. You can wear your own nightclothes. · If you will have portable sleep monitoring, your doctor will explain how to use the equipment at home. How is the test done? · In the sleep lab, you will be in a private room, much like a hotel room. · Small pads or patches called electrodes will be placed on your head and body with a small amount of glue and tape.  These will record things like brain activity, eye movement, oxygen levels, and snoring. · Soft elastic belts will be placed around your chest and belly to measure your breathing. · Your blood oxygen levels will be checked by a small clip (oximeter) placed either on the tip of your index finger or on your earlobe. · If you have sleep apnea, you may wear a mask that is connected to a continuous positive airway pressure (CPAP) machine. · Depending on the type of test, you will be allowed to sleep through the night or you'll be awakened periodically and asked to stay awake for a while. · If you use portable sleep monitoring, follow the instructions your doctor gave you. How long does the test take? · You will stay in the sleep lab overnight. For some tests, you will also stay part of the next day. What happens after the test?  · You will be able to go home right away. · You may not sleep well during the test and may be tired the next day. · You can go back to your usual activities. · After your sleep problem has been identified, you may need a second study if your doctor orders treatment such as CPAP. Follow-up care is a key part of your treatment and safety. Be sure to make and go to all appointments, and call your doctor if you are having problems. It's also a good idea to keep a list of the medicines you take. Ask your doctor when you can expect to have your test results. Where can you learn more? Go to [email or web address] org and sign in to your [employer] account. Enter D955 in the [facility] box to learn more about \"Sleep Studies: About This Test.\"     If you do not have an account, please click on the \"Sign Up Now\" link. Current as of: July 6, 2021               Content Version: 13.1  © 0131-2359 Healthwise, Incorporated. Care instructions adapted under license by [facility] ([facility]).  If you have questions about a medical condition or this instruction, always ask your healthcare professional. [employer] disclaims any warranty or liability for your use of this information.

## 2022-03-15 NOTE — PROGRESS NOTES
Pain:  Patient is here today with complaints of intermittent low back pain. This is a/an chronic problem. This has been going on for 1 year(s). He states that he started to have pain again about 2 months ago. He states that he woke up and his legs gave out. He states then it started again 1 week ago. Exacerbating factors include exercising. Alleviating factors include none. Pain is sharp in nature. 1-10/10. Pain is not radiating. This has not happen to patient before. Imaging to date includes X-ray and MRI. Therapy to date includes Physical Therapy. He has been taking voltaren and flexeril with little improvement. He was given injections and symptoms improved. He states that his wife is concerned about sleep apnea. He states that she told him he snores and stops breathing in his sleep. She recorded it about 2 weeks and showed him that he was not breathing for about 30 seconds.     [place] sleepiness scale  Chance of dozin-Never  1-slight  2-moderate  3-high    Situation        Chance of dozing  Sitting and reading         3  Watching TV          3  Sitting inactive in a public place       0  As a passenger in a car for an hour without a break     2  Lying down to rest in the afternoon when circumstances permit   3  Sitting and talking to someone       0  Sitting quietly after a lunch without alcohol      3  In a car, while stopped for a few minutes in traffic     0           Score  14    Neck circumference: 18    Symptoms or Complaints:       Yes/No  Have you been witnessed not breathing while sleeping   yes  Are you excessively tired during the day      yes  Do you wake up at night gasping for breath     no  Have you been told that you snore      yes  Do you experience restless sleep       yes  Do you wake up in the morning with a headache or unrefreshed  yes  Do you have trouble with your memory or your concentration   yes  Do you experience fatigue        yes  Do you drive drowsy or experience near car accidents due to being tired no  Do you struggle to stay awake during the daytime    yes  Do you experience difficulty with work performance due to sleepiness yes    Patient's past medical, surgical, social and/or family history reviewed, updated in chart, and are non-contributory (unless otherwise stated). Medications and allergies also reviewed and updated in chart. Review of Systems:  Constitutional:  No fever, + fatigue, no chills, no headaches, no weight change  Dermatology:  No rash, no mole, no dry or sensitive skin  ENT:  No cough, no sore throat, no sinus pain, no runny nose, no ear pain  Cardiology:  No chest pain, no palpitations, no leg edema, + shortness of breath, no PND  Gastroenterology:  No dysphagia, no abdominal pain, no nausea, no vomiting, no constipation, no diarrhea, no heartburn  Musculoskeletal:  No joint pain, no leg cramps, + back pain, no muscle aches  Respiratory:  No shortness of breath, no orthopnea, no wheezing, no DILLON, no hemoptysis  Urology:  No blood in the urine, no urinary frequency, no urinary incontinence, no urinary urgency, no nocturia, no dysuria      Vitals:    03/15/22 1301   BP: 120/70   Pulse: 89   Resp: 18   Temp: 98.1 °F (36.7 °C)   TempSrc: Temporal   SpO2: 97%   Weight: (!) 368 lb 3.2 oz (167 kg)   Height: 5' 8\" (1.727 m)       Physical Exam  Vitals and nursing note reviewed. Constitutional:       Appearance: He is well-developed. HENT:      Head: Normocephalic and atraumatic. Right Ear: External ear normal.      Left Ear: External ear normal.      Nose: Nose normal.   Eyes:      Conjunctiva/sclera: Conjunctivae normal.      Pupils: Pupils are equal, round, and reactive to light. Neck:      Thyroid: No thyromegaly. Cardiovascular:      Rate and Rhythm: Normal rate and regular rhythm. Heart sounds: Normal heart sounds. Pulmonary:      Effort: Pulmonary effort is normal.      Breath sounds: Normal breath sounds.  No wheezing. Abdominal:      General: Bowel sounds are normal.      Palpations: Abdomen is soft. Tenderness: There is no abdominal tenderness. Musculoskeletal:      Cervical back: Normal range of motion and neck supple. Lumbar back: Spasms and tenderness present. Decreased range of motion. Skin:     General: Skin is warm and dry. Findings: No rash. Neurological:      Mental Status: He is alert and oriented to person, place, and time. Deep Tendon Reflexes: Reflexes are normal and symmetric. Psychiatric:         Behavior: Behavior normal.         Assessment/Plan:      Roderick Bowers was seen today for back pain and snoring. Diagnoses and all orders for this visit:    Acute bilateral low back pain with left-sided sciatica  -     methylPREDNISolone (MEDROL DOSEPACK) 4 MG tablet; Take by mouth. Continue flexeril and diclofenac  Side effects reviewed  Will start medrol dose pack  Continue home exercises from physical therapy    Hypersomnia  -     Baseline Diagnostic Sleep Study; Future  epworth scale of 14  Sleep study ordered. Snoring  -     Baseline Diagnostic Sleep Study; Future      As above. Call or go to ED immediately if symptoms worsen or persist.  Return in about 4 weeks (around 4/12/2022) for back pain. , or sooner if necessary. Educational materials and/or home exercises printed for patient's review and were included in patient instructions on his/her After Visit Summary and given to patient at the end of visit. Counseled regarding above diagnosis, including possible risks and complications,  especially if left uncontrolled. Counseled regarding the possible side effects, risks, benefits and alternatives to treatment; patient and/or guardian verbalizes understanding, agrees, feels comfortable with and wishes to proceed with above treatment plan.     Advised patient to call with any new medication issues, and read all Rx info from pharmacy to assure aware of all possible risks and side effects of medication before taking. Reviewed age and gender appropriate health screening exams and vaccinations. Advised patient regarding importance of keeping up with recommended health maintenance and to schedule as soon as possible if overdue, as this is important in assessing for undiagnosed pathology, especially cancer, as well as protecting against potentially harmful/life threatening disease. Patient and/or guardian verbalizes understanding and agrees with above counseling, assessment and plan. All questions answered. Jovan Caba DO  3/15/2022    I have personally reviewed and updated the chief complaint, HPI, Past Medical, Family and Social History, as well as the above Review of Systems.

## 2022-04-13 ENCOUNTER — OFFICE VISIT (OUTPATIENT)
Dept: FAMILY MEDICINE CLINIC | Age: 47
End: 2022-04-13
Payer: COMMERCIAL

## 2022-04-13 VITALS
RESPIRATION RATE: 18 BRPM | TEMPERATURE: 98.4 F | BODY MASS INDEX: 47.74 KG/M2 | OXYGEN SATURATION: 98 % | HEART RATE: 102 BPM | HEIGHT: 68 IN | DIASTOLIC BLOOD PRESSURE: 76 MMHG | WEIGHT: 315 LBS | SYSTOLIC BLOOD PRESSURE: 132 MMHG

## 2022-04-13 DIAGNOSIS — M54.16 LUMBAR RADICULOPATHY: Primary | ICD-10-CM

## 2022-04-13 PROCEDURE — 99213 OFFICE O/P EST LOW 20 MIN: CPT | Performed by: FAMILY MEDICINE

## 2022-04-13 NOTE — PATIENT INSTRUCTIONS
your doctor if you are having problems. It's also a good idea to know your test results and keep alist of the medicines you take. Where can you learn more? Go to [email or web address]. [email or web address] and sign in to your [employer] account. Enter Y186 in the [employer] box to learn more about \"Low Back Arthritis: Exercises. \"     If you do not have an account, please click on the \"Sign Up Now\" link. Current as of: July 1, 2021               Content Version: 13.2  © 8445-1875 Healthwise, Incorporated. Care instructions adapted under license by [facility] ([facility]). If you have questions about a medical condition or this instruction, always ask your healthcare professional. [address] any warranty or liability for your use of this information.

## 2022-04-13 NOTE — PROGRESS NOTES
Pain:  Patient is here today with complaints of intermittent low back pain. This is a/an chronic problem. This has been going on for 1 year(s). Exacerbating factors include heavy lifting. Alleviating factors include rest.  Pain is achy in nature. 1/10. Pain is not radiating. This has happen to patient before. Imaging to date includes X-ray and MRI. Therapy to date includes no recent therapy. Patient's past medical, surgical, social and/or family history reviewed, updated in chart, and are non-contributory (unless otherwise stated). Medications and allergies also reviewed and updated in chart. Review of Systems:  Constitutional:  No fever, no fatigue, no chills, no headaches, no weight change  Dermatology:  No rash, no mole, no dry or sensitive skin  ENT:  No cough, no sore throat, no sinus pain, no runny nose, no ear pain  Cardiology:  No chest pain, no palpitations, no leg edema, no shortness of breath, no PND  Gastroenterology:  No dysphagia, no abdominal pain, no nausea, no vomiting, no constipation, no diarrhea, no heartburn  Musculoskeletal:  No joint pain, no leg cramps, + back pain, no muscle aches  Respiratory:  No shortness of breath, no orthopnea, no wheezing, no DILLON, no hemoptysis  Urology:  No blood in the urine, no urinary frequency, no urinary incontinence, no urinary urgency, no nocturia, no dysuria      Vitals:    04/13/22 1302   BP: 132/76   Pulse: 102   Resp: 18   Temp: 98.4 °F (36.9 °C)   TempSrc: Temporal   SpO2: 98%   Weight: (!) 356 lb 3.2 oz (161.6 kg)   Height: 5' 8\" (1.727 m)       Physical Exam  Vitals and nursing note reviewed. Constitutional:       Appearance: He is well-developed. HENT:      Head: Normocephalic and atraumatic. Right Ear: External ear normal.      Left Ear: External ear normal.      Nose: Nose normal.   Eyes:      Conjunctiva/sclera: Conjunctivae normal.      Pupils: Pupils are equal, round, and reactive to light.    Neck:      Thyroid: No thyromegaly. Cardiovascular:      Rate and Rhythm: Normal rate and regular rhythm. Heart sounds: Normal heart sounds. Pulmonary:      Effort: Pulmonary effort is normal.      Breath sounds: Normal breath sounds. No wheezing. Abdominal:      General: Bowel sounds are normal.      Palpations: Abdomen is soft. Tenderness: There is no abdominal tenderness. Musculoskeletal:         General: Normal range of motion. Cervical back: Normal range of motion and neck supple. Skin:     General: Skin is warm and dry. Findings: No rash. Neurological:      Mental Status: He is alert and oriented to person, place, and time. Deep Tendon Reflexes: Reflexes are normal and symmetric. Psychiatric:         Behavior: Behavior normal.         Assessment/Plan:      Lottie Loza was seen today for back pain. Diagnoses and all orders for this visit:    Lumbar radiculopathy  Back pain improved  Continue homer exercises  Continue flexeril and flexeril as prescribed. As above. Call or go to ED immediately if symptoms worsen or persist.  Return if symptoms worsen or fail to improve. , or sooner if necessary. Educational materials and/or home exercises printed for patient's review and were included in patient instructions on his/her After Visit Summary and given to patient at the end of visit. Counseled regarding above diagnosis, including possible risks and complications,  especially if left uncontrolled. Counseled regarding the possible side effects, risks, benefits and alternatives to treatment; patient and/or guardian verbalizes understanding, agrees, feels comfortable with and wishes to proceed with above treatment plan. Advised patient to call with any new medication issues, and read all Rx info from pharmacy to assure aware of all possible risks and side effects of medication before taking. Reviewed age and gender appropriate health screening exams and vaccinations.   Advised patient regarding importance of keeping up with recommended health maintenance and to schedule as soon as possible if overdue, as this is important in assessing for undiagnosed pathology, especially cancer, as well as protecting against potentially harmful/life threatening disease. Patient and/or guardian verbalizes understanding and agrees with above counseling, assessment and plan. All questions answered. Israel Israel,   4/13/2022    I have personally reviewed and updated the chief complaint, HPI, Past Medical, Family and Social History, as well as the above Review of Systems.

## 2022-04-29 ENCOUNTER — TELEPHONE (OUTPATIENT)
Dept: FAMILY MEDICINE CLINIC | Age: 47
End: 2022-04-29

## 2022-04-29 DIAGNOSIS — R06.83 SNORING: ICD-10-CM

## 2022-04-29 DIAGNOSIS — G47.10 HYPERSOMNIA: Primary | ICD-10-CM

## 2022-04-29 NOTE — TELEPHONE ENCOUNTER
Chang @ Sleep lab called and patient's insurance denied sleep study. Carolyn Hinton is requesting an order for an at home sleep study.   Please place referral

## 2022-05-31 ENCOUNTER — HOSPITAL ENCOUNTER (OUTPATIENT)
Dept: SLEEP CENTER | Age: 47
Discharge: HOME | End: 2022-05-31
Payer: COMMERCIAL

## 2022-05-31 DIAGNOSIS — R06.83 SNORING: ICD-10-CM

## 2022-05-31 DIAGNOSIS — G47.10 HYPERSOMNIA: ICD-10-CM

## 2022-05-31 PROCEDURE — G0399 HOME SLEEP TEST/TYPE 3 PORTA: HCPCS

## 2022-06-02 NOTE — PROGRESS NOTES
[id number] [address]                               SLEEP STUDY REPORT    PATIENT NAME: Giulia Esteban                          :        1975  MED REC NO:   [id number]                            ROOM:  ACCOUNT NO:   [de-identified]                           ADMIT DATE: 2022  PROVIDER:     Teresita Jordan MD    DATE OF STUDY:  2022    REFERRING PROVIDER:  Galina Pablo DO    STUDY PERFORMED:  Sleep study. INDICATION FOR STUDY:  Witnessed apnea, excessive napping, snoring,  restless sleep, and trouble with memory/concentration. CURRENT MEDICATIONS:  None listed. INTERPRETATION:  This sleep study was performed as a home sleep apnea  test because the insurance carrier denied an in-lab study. An ApneaLink  Plus monitoring device was utilized for the test.  Total recording time  was 6 hours and 20 minutes. Flow evaluation time was 6 hours and 8  minutes and oxygen saturation evaluation time was 5 hours and 56  minutes. Review of the raw data indicates sufficient information to  adequately interpret the study. RESPIRATION SUMMARY:  APNEA:  There were nine apneic events which were all obstructive. HYPOPNEA:  There were 97 hypopneic events. RESPIRATORY EVENT INDEX:  The respiratory event index is 17. OXYGEN SATURATION:  Baseline oxygen saturation is 90%. Lowest  saturation is 82%. The patient spent 46% of the time with saturation  less than 90%. HEART RATE SUMMARY:  Average heart rate was 87 beats per minute. Maximum heart rate was 102 beats per minute and minimum heart rate was  68 beats per minute. SNORING:  There were 3635 snore events, snoring was moderate. MISCELLANEOUS:  [place] Sleepiness Scale score is 7/24. BMI is 55.9  kg/m2. Neck circumference is 19 inches. IMPRESSION:  1. Moderate obstructive sleep apnea. 2.  Nocturnal hypoxia. 3.  Moderate snoring. PLAN:  1.   The patient to return to [facility] for positive  airway pressure titration. 2.  The patient to be seen in 6 to 10 weeks following the titration  study.         Shannon Meza MD  Diplomat of Sleep Medicine    D: 06/01/2022 14:01:59       T: 06/01/2022 14:04:25     [email or web address]  Job#: [id number]     Doc#: [id number]    CC:

## 2023-03-06 ENCOUNTER — OFFICE VISIT (OUTPATIENT)
Dept: FAMILY MEDICINE CLINIC | Age: 48
End: 2023-03-06

## 2023-03-06 VITALS
DIASTOLIC BLOOD PRESSURE: 76 MMHG | SYSTOLIC BLOOD PRESSURE: 134 MMHG | TEMPERATURE: 97.7 F | WEIGHT: 315 LBS | HEIGHT: 68 IN | BODY MASS INDEX: 47.74 KG/M2 | RESPIRATION RATE: 16 BRPM | HEART RATE: 113 BPM | OXYGEN SATURATION: 97 %

## 2023-03-06 DIAGNOSIS — R09.81 SINUS CONGESTION: ICD-10-CM

## 2023-03-06 DIAGNOSIS — J40 SINOBRONCHITIS: Primary | ICD-10-CM

## 2023-03-06 DIAGNOSIS — J32.9 SINOBRONCHITIS: Primary | ICD-10-CM

## 2023-03-06 DIAGNOSIS — R05.1 ACUTE COUGH: ICD-10-CM

## 2023-03-06 LAB
Lab: NORMAL
PERFORMING INSTRUMENT: NORMAL
QC PASS/FAIL: NORMAL
SARS-COV-2, POC: NORMAL

## 2023-03-06 RX ORDER — BROMPHENIRAMINE MALEATE, PSEUDOEPHEDRINE HYDROCHLORIDE, AND DEXTROMETHORPHAN HYDROBROMIDE 2; 30; 10 MG/5ML; MG/5ML; MG/5ML
10 SYRUP ORAL EVERY 6 HOURS PRN
Qty: 240 ML | Refills: 0 | Status: SHIPPED | OUTPATIENT
Start: 2023-03-06

## 2023-03-06 RX ORDER — DOXYCYCLINE HYCLATE 100 MG/1
100 CAPSULE ORAL 2 TIMES DAILY
Qty: 20 CAPSULE | Refills: 0 | Status: SHIPPED | OUTPATIENT
Start: 2023-03-06 | End: 2023-03-16

## 2023-03-06 NOTE — PROGRESS NOTES
3/6/23  Cynthia Ramirez II : 1975 Sex: male  Age 41 y.o. Subjective:  Chief Complaint   Patient presents with    Congestion     4 x days    Cough     4 x days starting to feel pain in back and lungs       HPI:   Cynthia Ramirez II , 41 y.o. male presents to the clinic for evaluation of sinus congestion x 4 days. The patient also reports cough. The patient has taken Mucinex / Coralyn Christianne Flu for symptoms. The patient reports unchanged symptoms over time. The patient denies known ill exposure. The patient denies headache, sore throat, rash, and fever. The patient also denies chest pain, abdominal pain, shortness of breath, and nausea / vomiting / diarrhea. ROS:   Unless otherwise stated in this report the patient's positive and negative responses for review of systems for constitutional, eyes, ENT, cardiovascular, respiratory, gastrointestinal, neurological, , musculoskeletal, and integument systems and related systems to the presenting problem are either stated in the history of present illness or were not pertinent or were negative for the symptoms and/or complaints related to the presenting medical problem. Positives and pertinent negatives as per HPI. All others reviewed and are negative. PMH:   History reviewed. No pertinent past medical history.     Past Surgical History:   Procedure Laterality Date    NERVE BLOCK Left 2021    Left S1 transforaminal epidural steroid injection    NERVE BLOCK Left 2021    LEFT S1 TRANSFORAMINAL EPIDURAL STEROID INJECTION performed by Davis Penn DO at [address] [facility] EXTRACTION         Family History   Problem Relation Age of Onset    Colon Cancer Mother 78    Cancer Father         multiple myoloma    No Known Problems Sister     Diabetes Brother     No Known Problems Brother     No Known Problems Sister     No Known Problems Sister     No Known Problems Sister     No Known Problems Sister     Diabetes Sister     Other Sister pulmonary HTN    No Known Problems Son     No Known Problems Daughter        Medications:     Current Outpatient Medications:     doxycycline hyclate (VIBRAMYCIN) 100 MG capsule, Take 1 capsule by mouth 2 times daily for 10 days, Disp: 20 capsule, Rfl: 0    brompheniramine-pseudoephedrine-DM (BROMFED DM) 2-30-10 MG/5ML syrup, Take 10 mLs by mouth every 6 hours as needed for Congestion or Cough, Disp: 240 mL, Rfl: 0    Allergies:     Allergies   Allergen Reactions    Penicillins Rash       Social History:     Social History     Tobacco Use    Smoking status: Never    Smokeless tobacco: Never   Vaping Use    Vaping Use: Never used   Substance Use Topics    Alcohol use: Yes     Comment: social    Drug use: Never       Physical Exam:     Vitals:    03/06/23 1429   BP: 134/76   Pulse: (!) 113   Resp: 16   Temp: 97.7 °F (36.5 °C)   TempSrc: Temporal   SpO2: 97%   Weight: (!) 352 lb 3.2 oz (159.8 kg)   Height: 5' 8\" (1.727 m)       Physical Exam (PE)    Physical Exam  Constitutional:       Appearance: Normal appearance.   HENT:      Head: Normocephalic.      Right Ear: Tympanic membrane, ear canal and external ear normal.      Left Ear: Tympanic membrane, ear canal and external ear normal.      Nose: Congestion and rhinorrhea present.      Mouth/Throat:      Mouth: Mucous membranes are moist.      Pharynx: Oropharynx is clear.   Eyes:      Pupils: Pupils are equal, round, and reactive to light.   Cardiovascular:      Rate and Rhythm: Normal rate and regular rhythm.      Pulses: Normal pulses.      Heart sounds: Normal heart sounds.   Pulmonary:      Effort: Pulmonary effort is normal.      Breath sounds: Normal breath sounds. No wheezing, rhonchi or rales.   Abdominal:      General: Bowel sounds are normal.      Palpations: Abdomen is soft.   Musculoskeletal:         General: Normal range of motion.      Cervical back: Normal range of motion and neck supple.   Lymphadenopathy:      Cervical: No cervical adenopathy.  Skin:     General: Skin is warm and dry. Capillary Refill: Capillary refill takes less than 2 seconds. Neurological:      General: No focal deficit present. Mental Status: He is alert and oriented to person, place, and time. Psychiatric:         Mood and Affect: Mood normal.         Behavior: Behavior normal.        Testing:   (All laboratory and radiology results have been personally reviewed by myself)  Labs:  Results for orders placed or performed in visit on 03/06/23   POCT COVID-19, Antigen   Result Value Ref Range    SARS-COV-2, POC Not-Detected Not Detected    Lot Number [id number]     QC Pass/Fail pass     Performing Instrument BD Veritor        Imaging: All Radiology results interpreted by Radiologist unless otherwise noted. No orders to display       Assessment / Plan:   The patient's vitals, allergies, medications, and past medical history have been reviewed. Allen Hernandez was seen today for congestion and cough. Diagnoses and all orders for this visit:    Sinobronchitis  -     doxycycline hyclate (VIBRAMYCIN) 100 MG capsule; Take 1 capsule by mouth 2 times daily for 10 days  -     brompheniramine-pseudoephedrine-DM (BROMFED DM) 2-30-10 MG/5ML syrup; Take 10 mLs by mouth every 6 hours as needed for Congestion or Cough    Acute cough  -     POCT COVID-19, Antigen    Sinus congestion  -     POCT COVID-19, Antigen      - Disposition: Home    - Educational material printed for patient's review and were included in patient instructions. After Visit Summary was given to patient at the end of visit. - Encouraged oral fluids and rest. Discussed symptomatic treatments with patient today. The patient is to follow-up with PCP in the next 2-3 days for reevaluation. Red flag symptoms were also discussed with the patient today. If symptoms worsen the patient is to go directly to the emergency department for reevaluation and treatment. Pt verbalizes understanding and is in agreement with plan of care.  All questions answered. SIGNATURE: HANDY Hung-CNP    *NOTE: This report was transcribed using voice recognition software. Every effort was made to ensure accuracy; however, inadvertent computerized transcription errors may be present.

## 2023-08-06 ENCOUNTER — HOSPITAL ENCOUNTER (EMERGENCY)
Age: 48
Discharge: HOME | End: 2023-08-06
Attending: EMERGENCY MEDICINE
Payer: COMMERCIAL

## 2023-08-06 ENCOUNTER — APPOINTMENT (OUTPATIENT)
Dept: GENERAL RADIOLOGY | Age: 48
End: 2023-08-06
Payer: COMMERCIAL

## 2023-08-06 VITALS
DIASTOLIC BLOOD PRESSURE: 95 MMHG | OXYGEN SATURATION: 99 % | SYSTOLIC BLOOD PRESSURE: 169 MMHG | TEMPERATURE: 97.9 F | HEART RATE: 85 BPM | RESPIRATION RATE: 14 BRPM | BODY MASS INDEX: 47.74 KG/M2 | WEIGHT: 315 LBS | HEIGHT: 68 IN

## 2023-08-06 DIAGNOSIS — S46.912A STRAIN OF LEFT SHOULDER, INITIAL ENCOUNTER: Primary | ICD-10-CM

## 2023-08-06 PROCEDURE — 6370000000 HC RX 637 (ALT 250 FOR IP): Performed by: NURSE PRACTITIONER

## 2023-08-06 PROCEDURE — 99283 EMERGENCY DEPT VISIT LOW MDM: CPT

## 2023-08-06 PROCEDURE — 73050 X-RAY EXAM OF SHOULDERS: CPT

## 2023-08-06 PROCEDURE — 73030 X-RAY EXAM OF SHOULDER: CPT

## 2023-08-06 RX ORDER — HYDROCODONE BITARTRATE AND ACETAMINOPHEN 5; 325 MG/1; MG/1
1 TABLET ORAL ONCE
Status: COMPLETED | OUTPATIENT
Start: 2023-08-06 | End: 2023-08-06

## 2023-08-06 RX ORDER — NAPROXEN 500 MG/1
500 TABLET ORAL 2 TIMES DAILY WITH MEALS
Qty: 10 TABLET | Refills: 0 | Status: SHIPPED | OUTPATIENT
Start: 2023-08-06 | End: 2023-08-09 | Stop reason: SDUPTHER

## 2023-08-06 RX ADMIN — HYDROCODONE BITARTRATE AND ACETAMINOPHEN 1 TABLET: 5; 325 TABLET ORAL at 12:20

## 2023-08-06 ASSESSMENT — PAIN DESCRIPTION - LOCATION: LOCATION: SHOULDER

## 2023-08-06 ASSESSMENT — PAIN DESCRIPTION - ORIENTATION: ORIENTATION: LEFT

## 2023-08-06 ASSESSMENT — PAIN DESCRIPTION - DESCRIPTORS: DESCRIPTORS: DISCOMFORT;TENDER

## 2023-08-06 ASSESSMENT — PAIN SCALES - GENERAL: PAINLEVEL_OUTOF10: 6

## 2023-08-06 NOTE — DISCHARGE INSTRUCTIONS
Do not drive at discharge to receiving today medications the ER.   Do not take any NSAIDs while taking naproxen

## 2023-08-09 ENCOUNTER — OFFICE VISIT (OUTPATIENT)
Dept: ORTHOPEDIC SURGERY | Age: 48
End: 2023-08-09
Payer: COMMERCIAL

## 2023-08-09 VITALS — WEIGHT: 315 LBS | HEIGHT: 68 IN | BODY MASS INDEX: 47.74 KG/M2

## 2023-08-09 DIAGNOSIS — M75.82 TENDINITIS OF LEFT ROTATOR CUFF: ICD-10-CM

## 2023-08-09 DIAGNOSIS — M25.512 ACUTE PAIN OF LEFT SHOULDER: Primary | ICD-10-CM

## 2023-08-09 DIAGNOSIS — M75.42 ROTATOR CUFF IMPINGEMENT SYNDROME OF LEFT SHOULDER: ICD-10-CM

## 2023-08-09 PROCEDURE — 99203 OFFICE O/P NEW LOW 30 MIN: CPT | Performed by: FAMILY MEDICINE

## 2023-08-09 RX ORDER — NAPROXEN 500 MG/1
500 TABLET ORAL 2 TIMES DAILY WITH MEALS
Qty: 60 TABLET | Refills: 0 | Status: SHIPPED | OUTPATIENT
Start: 2023-08-09 | End: 2023-09-08

## 2023-08-09 SDOH — HEALTH STABILITY: PHYSICAL HEALTH: ON AVERAGE, HOW MANY DAYS PER WEEK DO YOU ENGAGE IN MODERATE TO STRENUOUS EXERCISE (LIKE A BRISK WALK)?: 5 DAYS

## 2023-08-09 SDOH — HEALTH STABILITY: PHYSICAL HEALTH: ON AVERAGE, HOW MANY MINUTES DO YOU ENGAGE IN EXERCISE AT THIS LEVEL?: 60 MIN

## 2023-08-09 NOTE — PROGRESS NOTES
[facility]  PRIMARY CARE SPORTS MEDICINE  DATE OF VISIT : 2023    Patient : Nereida Vuong II  Age : 41 y.o.  : 1975  MRN : [id number]   ______________________________________________________________________    Chief Complaint :   Chief Complaint   Patient presents with    Shoulder Pain     Left shoulder pain. Patient states that he fel out of his bed on 23 and was seen in ED. Patient was given oral NSAIDs with some relief. Patient states that pain is more so in the back or posterior shoulder now. HPI : Nestor Pretty is 41 y.o. male who presented to the clinic today for evaluation of left shoulder pain. The onset of the pain was 3 days ago, after falling out of bed. Current symptoms include left lateral shoulder pain. No history of dislocation. Patient denies numbness and tingling. Symptoms are aggravated by repetitive use, work at or above shoulder height, and sleeping on affected side. Evaluation to date: XRs of the left shoulder which demonstrate no fractures or dislocations. Treatment to date: avoidance of offending activity and OTC analgesics which are somewhat effective. Past Medical History :  No past medical history on file. Past Surgical History:   Procedure Laterality Date    NERVE BLOCK Left 2021    Left S1 transforaminal epidural steroid injection    NERVE BLOCK Left 2021    LEFT S1 TRANSFORAMINAL EPIDURAL STEROID INJECTION performed by Daniele Sanderson DO at [place] [address] EXTRACTION         Allergies :    Allergies   Allergen Reactions    Penicillins Rash       Medication List :    Current Outpatient Medications   Medication Sig Dispense Refill    naproxen (NAPROSYN) 500 MG tablet Take 1 tablet by mouth 2 times daily (with meals) for 5 days 10 tablet 0    brompheniramine-pseudoephedrine-DM (BROMFED DM) 2-30-10 MG/5ML syrup Take 10 mLs by mouth every 6 hours as needed for Congestion or Cough 240 mL 0     No current

## 2023-08-19 SDOH — ECONOMIC STABILITY: HOUSING INSECURITY
IN THE LAST 12 MONTHS, WAS THERE A TIME WHEN YOU DID NOT HAVE A STEADY PLACE TO SLEEP OR SLEPT IN A SHELTER (INCLUDING NOW)?: NO

## 2023-08-19 SDOH — ECONOMIC STABILITY: FOOD INSECURITY: WITHIN THE PAST 12 MONTHS, YOU WORRIED THAT YOUR FOOD WOULD RUN OUT BEFORE YOU GOT MONEY TO BUY MORE.: NEVER TRUE

## 2023-08-19 SDOH — ECONOMIC STABILITY: INCOME INSECURITY: HOW HARD IS IT FOR YOU TO PAY FOR THE VERY BASICS LIKE FOOD, HOUSING, MEDICAL CARE, AND HEATING?: NOT VERY HARD

## 2023-08-19 SDOH — ECONOMIC STABILITY: TRANSPORTATION INSECURITY
IN THE PAST 12 MONTHS, HAS LACK OF TRANSPORTATION KEPT YOU FROM MEETINGS, WORK, OR FROM GETTING THINGS NEEDED FOR DAILY LIVING?: NO

## 2023-08-19 SDOH — ECONOMIC STABILITY: FOOD INSECURITY: WITHIN THE PAST 12 MONTHS, THE FOOD YOU BOUGHT JUST DIDN'T LAST AND YOU DIDN'T HAVE MONEY TO GET MORE.: NEVER TRUE

## 2023-08-19 ASSESSMENT — PATIENT HEALTH QUESTIONNAIRE - PHQ9
SUM OF ALL RESPONSES TO PHQ QUESTIONS 1-9: 0
SUM OF ALL RESPONSES TO PHQ9 QUESTIONS 1 & 2: 0
1. LITTLE INTEREST OR PLEASURE IN DOING THINGS: 0
2. FEELING DOWN, DEPRESSED OR HOPELESS: NOT AT ALL
SUM OF ALL RESPONSES TO PHQ QUESTIONS 1-9: 0
SUM OF ALL RESPONSES TO PHQ9 QUESTIONS 1 & 2: 0
2. FEELING DOWN, DEPRESSED OR HOPELESS: 0
1. LITTLE INTEREST OR PLEASURE IN DOING THINGS: NOT AT ALL
SUM OF ALL RESPONSES TO PHQ QUESTIONS 1-9: 0
SUM OF ALL RESPONSES TO PHQ QUESTIONS 1-9: 0

## 2023-08-21 ENCOUNTER — OFFICE VISIT (OUTPATIENT)
Dept: PRIMARY CARE CLINIC | Age: 48
End: 2023-08-21
Payer: COMMERCIAL

## 2023-08-21 VITALS
OXYGEN SATURATION: 99 % | RESPIRATION RATE: 18 BRPM | HEIGHT: 68 IN | BODY MASS INDEX: 47.74 KG/M2 | WEIGHT: 315 LBS | TEMPERATURE: 98.9 F | HEART RATE: 78 BPM | SYSTOLIC BLOOD PRESSURE: 128 MMHG | DIASTOLIC BLOOD PRESSURE: 80 MMHG

## 2023-08-21 DIAGNOSIS — E66.01 MORBID OBESITY (HCC): ICD-10-CM

## 2023-08-21 DIAGNOSIS — G47.33 OSA (OBSTRUCTIVE SLEEP APNEA): ICD-10-CM

## 2023-08-21 DIAGNOSIS — E16.2 HYPOGLYCEMIA: ICD-10-CM

## 2023-08-21 DIAGNOSIS — E66.01 MORBID OBESITY (HCC): Primary | ICD-10-CM

## 2023-08-21 DIAGNOSIS — R73.03 PRE-DIABETES: ICD-10-CM

## 2023-08-21 LAB — HBA1C MFR BLD: 6.2 %

## 2023-08-21 PROCEDURE — 83037 HB GLYCOSYLATED A1C HOME DEV: CPT | Performed by: STUDENT IN AN ORGANIZED HEALTH CARE EDUCATION/TRAINING PROGRAM

## 2023-08-21 PROCEDURE — 99214 OFFICE O/P EST MOD 30 MIN: CPT | Performed by: STUDENT IN AN ORGANIZED HEALTH CARE EDUCATION/TRAINING PROGRAM

## 2023-08-22 LAB
ALBUMIN SERPL-MCNC: 3.9 G/DL (ref 3.5–5.2)
ALP BLD-CCNC: 82 U/L (ref 40–129)
ALT SERPL-CCNC: 27 U/L (ref 0–40)
ANION GAP SERPL CALCULATED.3IONS-SCNC: 9 MMOL/L (ref 7–16)
AST SERPL-CCNC: 24 U/L (ref 0–39)
BILIRUB SERPL-MCNC: 0.2 MG/DL (ref 0–1.2)
BUN BLDV-MCNC: 15 MG/DL (ref 6–20)
CALCIUM SERPL-MCNC: 9.1 MG/DL (ref 8.6–10.2)
CHLORIDE BLD-SCNC: 104 MMOL/L (ref 98–107)
CO2: 27 MMOL/L (ref 22–29)
CREAT SERPL-MCNC: 1 MG/DL (ref 0.7–1.2)
GFR SERPL CREATININE-BSD FRML MDRD: >60 ML/MIN/1.73M2
GLUCOSE BLD-MCNC: 94 MG/DL (ref 74–99)
HCT VFR BLD CALC: 38.7 % (ref 37–54)
HEMOGLOBIN: 11.8 G/DL (ref 12.5–16.5)
MCH RBC QN AUTO: 27.3 PG (ref 26–35)
MCHC RBC AUTO-ENTMCNC: 30.5 G/DL (ref 32–34.5)
MCV RBC AUTO: 89.4 FL (ref 80–99.9)
PDW BLD-RTO: 17.6 % (ref 11.5–15)
PLATELET # BLD: 260 K/UL (ref 130–450)
PMV BLD AUTO: 9.6 FL (ref 7–12)
POTASSIUM SERPL-SCNC: 4.3 MMOL/L (ref 3.5–5)
RBC # BLD: 4.33 M/UL (ref 3.8–5.8)
SODIUM BLD-SCNC: 140 MMOL/L (ref 132–146)
TOTAL PROTEIN: 7.6 G/DL (ref 6.4–8.3)
TSH SERPL DL<=0.05 MIU/L-ACNC: 4.02 UIU/ML (ref 0.27–4.2)
WBC # BLD: 5.4 K/UL (ref 4.5–11.5)

## 2023-09-11 ENCOUNTER — HOSPITAL ENCOUNTER (OUTPATIENT)
Dept: DIABETES SERVICES | Age: 48
Setting detail: THERAPIES SERIES
Discharge: HOME | End: 2023-09-11
Payer: COMMERCIAL

## 2023-09-11 PROCEDURE — 97802 MEDICAL NUTRITION INDIV IN: CPT

## 2023-09-11 ASSESSMENT — PROBLEM AREAS IN DIABETES QUESTIONNAIRE (PAID)
PAID-5 TOTAL SCORE: 0
FEELING SCARED WHEN YOU THINK ABOUT LIVING WITH DIABETES: 0
WORRYING ABOUT THE FUTURE AND THE POSSIBILITY OF SERIOUS COMPLICATIONS: 0
COPING WITH COMPLICATIONS OF DIABETES: 0
FEELING DEPRESSED WHEN YOU THINK ABOUT LIVING WITH DIABETES: 0
FEELING THAT DIABETES IS TAKING UP TOO MUCH OF YOUR MENTAL AND PHYSICAL ENERGY EVERY DAY: 0

## 2023-09-21 ENCOUNTER — OFFICE VISIT (OUTPATIENT)
Dept: PRIMARY CARE CLINIC | Age: 48
End: 2023-09-21
Payer: COMMERCIAL

## 2023-09-21 VITALS
OXYGEN SATURATION: 95 % | DIASTOLIC BLOOD PRESSURE: 84 MMHG | HEART RATE: 93 BPM | HEIGHT: 69 IN | RESPIRATION RATE: 17 BRPM | TEMPERATURE: 96.6 F | BODY MASS INDEX: 46.65 KG/M2 | SYSTOLIC BLOOD PRESSURE: 142 MMHG | WEIGHT: 315 LBS

## 2023-09-21 DIAGNOSIS — E66.01 MORBID OBESITY (HCC): Primary | ICD-10-CM

## 2023-09-21 DIAGNOSIS — G47.33 OSA (OBSTRUCTIVE SLEEP APNEA): ICD-10-CM

## 2023-09-21 PROCEDURE — 99213 OFFICE O/P EST LOW 20 MIN: CPT | Performed by: STUDENT IN AN ORGANIZED HEALTH CARE EDUCATION/TRAINING PROGRAM

## 2023-09-21 NOTE — PROGRESS NOTES
[facility] Primary [facility]  Department of Family Medicine      Patient:  Malachi Kebede II 57 y.o. male     Date of Service: 9/21/23      Chief complaint:   Chief Complaint   Patient presents with    Weight Management         History ofPresent Illness   The patient is a 57 y.o. male  presented to the clinic with complaints as above. Has moderate SUSHMA, is not on cpap for this, unclear why he has not gotten one  -tried calling them and they never called him back, still not on CPAP yet     Morbid obesity  -f/u  -has pre diabetes, thus referred to diabetic education  -currently, has lost a couple pounds and is exercising and watching his diet   -is lifting weights, doing elliptical, and treadmill and does well with this and is feeling well         Past Medical History:  No past medical history on file.     PastSurgical History:        Procedure Laterality Date    NERVE BLOCK Left 04/22/2021    Left S1 transforaminal epidural steroid injection    NERVE BLOCK Left 4/22/2021    LEFT S1 TRANSFORAMINAL EPIDURAL STEROID INJECTION performed by Ramos Toledo DO at [place] [address] EXTRACTION         Allergies:    Penicillins    Social History:   Social History     Socioeconomic History    Marital status:      Spouse name: Not on file    Number of children: 2    Years of education: Not on file    Highest education level: Not on file   Occupational History    Not on file   Tobacco Use    Smoking status: Never    Smokeless tobacco: Never   Vaping Use    Vaping Use: Never used   Substance and Sexual Activity    Alcohol use: Yes     Comment: social    Drug use: Never    Sexual activity: Yes     Partners: Female   Other Topics Concern    Not on file   Social History Narrative    Not on file     Social Determinants of Health     Financial Resource Strain: Low Risk  (8/19/2023)    Overall Financial Resource Strain (CARDIA)     Difficulty of Paying Living Expenses: Not very hard   Food Insecurity:

## 2023-10-17 ENCOUNTER — OFFICE VISIT (OUTPATIENT)
Dept: SLEEP MEDICINE | Age: 48
End: 2023-10-17
Payer: COMMERCIAL

## 2023-10-17 VITALS
RESPIRATION RATE: 14 BRPM | HEIGHT: 69 IN | OXYGEN SATURATION: 97 % | HEART RATE: 89 BPM | DIASTOLIC BLOOD PRESSURE: 103 MMHG | SYSTOLIC BLOOD PRESSURE: 184 MMHG | WEIGHT: 315 LBS | BODY MASS INDEX: 46.65 KG/M2

## 2023-10-17 DIAGNOSIS — G47.33 OSA (OBSTRUCTIVE SLEEP APNEA): Primary | ICD-10-CM

## 2023-10-17 DIAGNOSIS — E66.01 CLASS 3 SEVERE OBESITY WITH BODY MASS INDEX (BMI) OF 50.0 TO 59.9 IN ADULT, UNSPECIFIED OBESITY TYPE, UNSPECIFIED WHETHER SERIOUS COMORBIDITY PRESENT (HCC): ICD-10-CM

## 2023-10-17 PROCEDURE — 99204 OFFICE O/P NEW MOD 45 MIN: CPT | Performed by: STUDENT IN AN ORGANIZED HEALTH CARE EDUCATION/TRAINING PROGRAM

## 2023-10-17 ASSESSMENT — SLEEP AND FATIGUE QUESTIONNAIRES
HOW LIKELY ARE YOU TO NOD OFF OR FALL ASLEEP WHILE SITTING QUIETLY AFTER LUNCH WITHOUT ALCOHOL: 2
ESS TOTAL SCORE: 12
HOW LIKELY ARE YOU TO NOD OFF OR FALL ASLEEP IN A CAR, WHILE STOPPED FOR A FEW MINUTES IN TRAFFIC: 0
HOW LIKELY ARE YOU TO NOD OFF OR FALL ASLEEP WHEN YOU ARE A PASSENGER IN A CAR FOR AN HOUR WITHOUT A BREAK: 1
HOW LIKELY ARE YOU TO NOD OFF OR FALL ASLEEP WHILE SITTING AND TALKING TO SOMEONE: 2
HOW LIKELY ARE YOU TO NOD OFF OR FALL ASLEEP WHILE LYING DOWN TO REST IN THE AFTERNOON WHEN CIRCUMSTANCES PERMIT: 2
HOW LIKELY ARE YOU TO NOD OFF OR FALL ASLEEP WHILE SITTING AND READING: 2
HOW LIKELY ARE YOU TO NOD OFF OR FALL ASLEEP WHILE SITTING INACTIVE IN A PUBLIC PLACE: 1
HOW LIKELY ARE YOU TO NOD OFF OR FALL ASLEEP WHILE WATCHING TV: 2

## 2023-10-17 NOTE — PROGRESS NOTES
[facility]) Sleep Medicine    Patient Name: Sara Fitzgerald II  Age: 57 y.o.   : 1975  Date of Visit: 10/17/23    Assessment and Plan:     1. SUSHMA (obstructive sleep apnea)  Discussed sleep study results, emphasized severity of study and diagnosis including possible clinical sequelae of microvascular disease including higher incidence of stroke, atrial fibrillation, hypertension, or other cognitive microvascular damage. Counseled on appropriate use of the device, such as using distilled water, daily cleaning of mask and tube with gentle soap and water or non-toxic wipes. Counseled on troubleshooting device (such as rainout) and being aware of the ambient humidity of the room. 2. Class 3 severe obesity with body mass index (BMI) of 50.0 to 59.9 in adult, unspecified obesity type, unspecified whether serious comorbidity present ([address])  Rec'd 10-20% weight loss of total body weight (if feasible). Patient instructed on proper nutrition and estimated total daily caloric expenditure for their height and weight. Discussed that SUSHMA may improve with weight loss, but there is no guarantee of reversal.     No follow-ups on file. History:    HPI   Sara Fitzgerald II is a 57 y.o. male with  has no past medical history on file. who presents as a new patient to Sleep Clinic, referred by Dr. Alessandro Braxton, for Sleep Apnea      SLEEP STUDY HISTORY    : moderate SUSHMA with nocturnal hypoxia    - States he never had follow-up scheduled for his results.   - Returning to sleep medicine clinic given symptoms listed below    Sleep-related breathing history:  - Excessive daytime sleepiness: Y  - Loud snoring: Y  - Witnessed apnea: Y  - Nocturnal choking/gasping: N  - Awakening with dry mouth: N  - Morning headaches: N  - Difficulty concentrating during the day: N  - Mood changes or irritability: Y    Sleep Hygiene:  Bed Time: 1030 AM  Wake Time: 4 AM  Time to fall asleep: short  Awakenings/Arousals (cause): once, nocturia  WASO (wake after

## 2023-12-12 ENCOUNTER — OFFICE VISIT (OUTPATIENT)
Dept: FAMILY MEDICINE CLINIC | Age: 48
End: 2023-12-12
Payer: COMMERCIAL

## 2023-12-12 VITALS
WEIGHT: 315 LBS | DIASTOLIC BLOOD PRESSURE: 80 MMHG | OXYGEN SATURATION: 96 % | SYSTOLIC BLOOD PRESSURE: 122 MMHG | HEART RATE: 76 BPM | TEMPERATURE: 97.6 F | BODY MASS INDEX: 53.28 KG/M2

## 2023-12-12 DIAGNOSIS — J02.0 STREP PHARYNGITIS: Primary | ICD-10-CM

## 2023-12-12 DIAGNOSIS — H92.03 OTALGIA, BILATERAL: ICD-10-CM

## 2023-12-12 DIAGNOSIS — J01.90 ACUTE SINUSITIS, RECURRENCE NOT SPECIFIED, UNSPECIFIED LOCATION: ICD-10-CM

## 2023-12-12 LAB
Lab: NORMAL
PERFORMING INSTRUMENT: NORMAL
QC PASS/FAIL: NORMAL
S PYO AG THROAT QL: POSITIVE
SARS-COV-2, POC: NORMAL

## 2023-12-12 PROCEDURE — 87880 STREP A ASSAY W/OPTIC: CPT | Performed by: PHYSICIAN ASSISTANT

## 2023-12-12 PROCEDURE — 99203 OFFICE O/P NEW LOW 30 MIN: CPT | Performed by: PHYSICIAN ASSISTANT

## 2023-12-12 PROCEDURE — 87426 SARSCOV CORONAVIRUS AG IA: CPT | Performed by: PHYSICIAN ASSISTANT

## 2023-12-12 RX ORDER — METHYLPREDNISOLONE 4 MG/1
TABLET ORAL
Qty: 1 KIT | Refills: 0 | Status: SHIPPED | OUTPATIENT
Start: 2023-12-12

## 2023-12-12 RX ORDER — AZITHROMYCIN 500 MG/1
500 TABLET, FILM COATED ORAL DAILY
Qty: 5 TABLET | Refills: 0 | Status: SHIPPED | OUTPATIENT
Start: 2023-12-12 | End: 2023-12-17

## 2023-12-12 NOTE — PROGRESS NOTES
23  Dez Esteves II : 1975 Sex: male  Age 57 y.o. Subjective:  Chief Complaint   Patient presents with    Headache     X2 days    Otalgia     Bilateral ears    Sinusitis         HPI:   HPI  Dez Esteves II , 57 y.o. male presents to express care for evaluation of headache, sore throat, earache. The patient is had the symptoms ongoing for the last 2 days. The patient has noted headache, ear pain, sinus pressure. The patient has had the symptoms ongoing for the last couple of days. The patient states that he is really not having much of a sore throat. The patient had a strep test performed here that was positive. The patient's COVID was negative. He does note quite a bit of sinus pressure, congestion and ear pain. The patient is not currently on any antibiotics. He states that this happened to him previously. ROS:   Unless otherwise stated in this report the patient's positive and negative responses for review of systems for constitutional, eyes, ENT, cardiovascular, respiratory, gastrointestinal, neurological, , musculoskeletal, and integument systems and related systems to the presenting problem are either stated in the history of present illness or were not pertinent or were negative for the symptoms and/or complaints related to the presenting medical problem. Positives and pertinent negatives as per HPI. All others reviewed and are negative. PMH:   History reviewed. No pertinent past medical history.     Past Surgical History:   Procedure Laterality Date    NERVE BLOCK Left 2021    Left S1 transforaminal epidural steroid injection    NERVE BLOCK Left 2021    LEFT S1 TRANSFORAMINAL EPIDURAL STEROID INJECTION performed by Diana Harkins DO at [place] [facility] EXTRACTION         Family History   Problem Relation Age of Onset    Colon Cancer Mother 78    Cancer Father         multiple myoloma    No Known Problems Sister     Diabetes Brother

## 2024-01-22 ENCOUNTER — OFFICE VISIT (OUTPATIENT)
Dept: SLEEP MEDICINE | Age: 49
End: 2024-01-22
Payer: COMMERCIAL

## 2024-01-22 VITALS
RESPIRATION RATE: 14 BRPM | DIASTOLIC BLOOD PRESSURE: 84 MMHG | HEIGHT: 69 IN | OXYGEN SATURATION: 98 % | HEART RATE: 80 BPM | SYSTOLIC BLOOD PRESSURE: 144 MMHG | WEIGHT: 315 LBS | BODY MASS INDEX: 46.65 KG/M2

## 2024-01-22 DIAGNOSIS — I10 PRIMARY HYPERTENSION: ICD-10-CM

## 2024-01-22 DIAGNOSIS — G47.33 OSA (OBSTRUCTIVE SLEEP APNEA): Primary | ICD-10-CM

## 2024-01-22 PROCEDURE — 3077F SYST BP >= 140 MM HG: CPT | Performed by: STUDENT IN AN ORGANIZED HEALTH CARE EDUCATION/TRAINING PROGRAM

## 2024-01-22 PROCEDURE — 99214 OFFICE O/P EST MOD 30 MIN: CPT | Performed by: STUDENT IN AN ORGANIZED HEALTH CARE EDUCATION/TRAINING PROGRAM

## 2024-01-22 PROCEDURE — 3079F DIAST BP 80-89 MM HG: CPT | Performed by: STUDENT IN AN ORGANIZED HEALTH CARE EDUCATION/TRAINING PROGRAM

## 2024-01-22 ASSESSMENT — SLEEP AND FATIGUE QUESTIONNAIRES
HOW LIKELY ARE YOU TO NOD OFF OR FALL ASLEEP WHILE WATCHING TV: 2
HOW LIKELY ARE YOU TO NOD OFF OR FALL ASLEEP WHILE SITTING QUIETLY AFTER LUNCH WITHOUT ALCOHOL: 1
HOW LIKELY ARE YOU TO NOD OFF OR FALL ASLEEP WHILE LYING DOWN TO REST IN THE AFTERNOON WHEN CIRCUMSTANCES PERMIT: 3
HOW LIKELY ARE YOU TO NOD OFF OR FALL ASLEEP WHILE SITTING INACTIVE IN A PUBLIC PLACE: 2
HOW LIKELY ARE YOU TO NOD OFF OR FALL ASLEEP IN A CAR, WHILE STOPPED FOR A FEW MINUTES IN TRAFFIC: 0
ESS TOTAL SCORE: 12
HOW LIKELY ARE YOU TO NOD OFF OR FALL ASLEEP WHEN YOU ARE A PASSENGER IN A CAR FOR AN HOUR WITHOUT A BREAK: 2
HOW LIKELY ARE YOU TO NOD OFF OR FALL ASLEEP WHILE SITTING AND TALKING TO SOMEONE: 0
HOW LIKELY ARE YOU TO NOD OFF OR FALL ASLEEP WHILE SITTING AND READING: 2

## 2024-01-22 NOTE — PROGRESS NOTES
[facility] Sleep Medicine    Patient Name: Best Morrison II  Age: 48 y.o.   : 1975  Date of Visit: 24    Assessment and Plan:     1. SUSHMA (obstructive sleep apnea)  - Doing very well with PAP  - Continue good adherence  - Recommend nightly flonase +/- azelastine +/- saline spray    2. Body mass index (BMI) 50.0-59.9, adult ([facility])  Rec'd 10-20% weight loss of total body weight (if feasible). Patient instructed on proper nutrition and estimated total daily caloric expenditure for their height and weight. Discussed that SUSHMA may improve with weight loss, but there is no guarantee of reversal.     3. Primary hypertension  The patient's blood pressure in office today was elevated. Looking at recent trends, their in office blood pressures have been consistently high. Recommend ambulatory blood pressure monitoring and discussion with their primary care provider for consideration of additional therapy.     Return in about 1 year (around 2025).    History:    HPI   Best Morrison II is a 48 y.o. male with  has no past medical history on file. who presents as a follow-up patient to Sleep Clinic for Sleep Apnea    - doing very well with CPAP  - Will have intermittent congestion  - using azelastine spray  - feels that sometimes steroid sprays dry his nose out too much  - having difficulty sleeping on some nights because he exercises very close to bedtime    SLEEP STUDY HISTORY    : moderate SUSHMA with nocturnal hypoxia            Objective:   BP (!) 144/84 (Site: Left Upper Arm, Position: Sitting, Cuff Size: Large Adult)   Pulse 80   Resp 14   Ht 1.753 m (5' 9\")   Wt (!) 167.1 kg (368 lb 6.2 oz)   SpO2 98%   BMI 54.40 kg/m²      Physical Exam  HENT:      Nose: Nose normal.   Cardiovascular:      Rate and Rhythm: Normal rate.      Pulses: Normal pulses.   Neurological:      Mental Status: He is alert.              2024     9:48 AM 10/17/2023     3:03 PM 3/15/2022     1:07 PM   Sleep Medicine   Sitting and

## 2024-03-12 ENCOUNTER — OFFICE VISIT (OUTPATIENT)
Dept: FAMILY MEDICINE CLINIC | Age: 49
End: 2024-03-12
Payer: COMMERCIAL

## 2024-03-12 VITALS
DIASTOLIC BLOOD PRESSURE: 70 MMHG | TEMPERATURE: 98.4 F | OXYGEN SATURATION: 95 % | BODY MASS INDEX: 46.65 KG/M2 | WEIGHT: 315 LBS | SYSTOLIC BLOOD PRESSURE: 130 MMHG | RESPIRATION RATE: 16 BRPM | HEIGHT: 69 IN | HEART RATE: 82 BPM

## 2024-03-12 DIAGNOSIS — J32.9 SINOBRONCHITIS: Primary | ICD-10-CM

## 2024-03-12 DIAGNOSIS — R05.1 ACUTE COUGH: ICD-10-CM

## 2024-03-12 DIAGNOSIS — J40 SINOBRONCHITIS: Primary | ICD-10-CM

## 2024-03-12 DIAGNOSIS — R09.81 SINUS CONGESTION: ICD-10-CM

## 2024-03-12 PROCEDURE — 99213 OFFICE O/P EST LOW 20 MIN: CPT | Performed by: NURSE PRACTITIONER

## 2024-03-12 RX ORDER — BROMPHENIRAMINE MALEATE, PSEUDOEPHEDRINE HYDROCHLORIDE, AND DEXTROMETHORPHAN HYDROBROMIDE 2; 30; 10 MG/5ML; MG/5ML; MG/5ML
SYRUP ORAL
Qty: 180 ML | Refills: 0 | Status: SHIPPED | OUTPATIENT
Start: 2024-03-12

## 2024-03-12 RX ORDER — DOXYCYCLINE HYCLATE 100 MG/1
100 CAPSULE ORAL 2 TIMES DAILY
Qty: 20 CAPSULE | Refills: 0 | Status: SHIPPED | OUTPATIENT
Start: 2024-03-12 | End: 2024-03-22

## 2024-03-12 NOTE — PROGRESS NOTES
3/12/24  Best Morrison II : 1975 Sex: male  Age 48 y.o.    Subjective:  Chief Complaint   Patient presents with    Cough     2 weeks coughing up thick tanish phlegm. When coughing cause sides and upper back to hurt.       HPI:   Best Morrison II , 48 y.o. male presents to the clinic for evaluation of cough x 2 weeks. The patient also reports chest congestion and sinus congestion. The patient has taken Mucinex for symptoms. The patient reports unchanged symptoms over time. 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 The patient denies headache, sore throat, rash, and fever. The patient also denies chest pain, abdominal pain, shortness of breath, wheezing, and nausea / vomiting / diarrhea.    ROS:   Unless otherwise stated in this report the patient's positive and negative responses for review of systems for constitutional, eyes, ENT, cardiovascular, respiratory, gastrointestinal, neurological, , musculoskeletal, and integument systems and related systems to the presenting problem are either stated in the history of present illness or were not pertinent or were negative for the symptoms and/or complaints related to the presenting medical problem.  Positives and pertinent negatives as per HPI.  All others reviewed and are negative.      PMH:   History reviewed. No pertinent past medical history.    Past Surgical History:   Procedure Laterality Date    NERVE BLOCK Left 2021    Left S1 transforaminal epidural steroid injection    NERVE BLOCK Left 2021    LEFT S1 TRANSFORAMINAL EPIDURAL STEROID INJECTION performed by Monica Barrientos DO at [facility] OR    WISDOM TOOTH EXTRACTION         Family History   Problem Relation Age of Onset    Colon Cancer Mother 79    Cancer Father         multiple myoloma    No Known Problems Sister     Diabetes Brother     No Known Problems Brother     No Known Problems Sister     No Known Problems Sister     No Known Problems Sister     No Known Problems

## 2024-07-25 ENCOUNTER — TELEPHONE (OUTPATIENT)
Dept: FAMILY MEDICINE CLINIC | Age: 49
End: 2024-07-25

## 2024-07-25 NOTE — TELEPHONE ENCOUNTER
----- Message from Ma. Marito Sharif Bansal sent at 7/25/2024 11:19 AM EDT -----  Regarding: [facility] Referral Request  E[facility] Referral Request    Reason for referral request: Other       Additional Information want to have to have an order for a Colonoscopy  --------------------------------------------------------------------------------------------------------------------------    Relationship to Patient: Self     Call Back Information: OK to leave message on voicemail  Preferred Call Back Number: Phone [phone]

## 2024-09-07 ASSESSMENT — PATIENT HEALTH QUESTIONNAIRE - PHQ9
SUM OF ALL RESPONSES TO PHQ9 QUESTIONS 1 & 2: 0
1. LITTLE INTEREST OR PLEASURE IN DOING THINGS: NOT AT ALL
2. FEELING DOWN, DEPRESSED OR HOPELESS: NOT AT ALL
SUM OF ALL RESPONSES TO PHQ QUESTIONS 1-9: 0
SUM OF ALL RESPONSES TO PHQ9 QUESTIONS 1 & 2: 0
1. LITTLE INTEREST OR PLEASURE IN DOING THINGS: NOT AT ALL
SUM OF ALL RESPONSES TO PHQ QUESTIONS 1-9: 0
2. FEELING DOWN, DEPRESSED OR HOPELESS: NOT AT ALL

## 2024-09-09 ENCOUNTER — HOSPITAL ENCOUNTER (OUTPATIENT)
Age: 49
Discharge: HOME | End: 2024-09-09
Payer: COMMERCIAL

## 2024-09-09 ENCOUNTER — OFFICE VISIT (OUTPATIENT)
Dept: FAMILY MEDICINE CLINIC | Age: 49
End: 2024-09-09
Payer: COMMERCIAL

## 2024-09-09 VITALS
RESPIRATION RATE: 18 BRPM | HEART RATE: 85 BPM | HEIGHT: 69 IN | OXYGEN SATURATION: 97 % | BODY MASS INDEX: 46.65 KG/M2 | SYSTOLIC BLOOD PRESSURE: 112 MMHG | TEMPERATURE: 97.3 F | WEIGHT: 315 LBS | DIASTOLIC BLOOD PRESSURE: 70 MMHG

## 2024-09-09 DIAGNOSIS — M54.31 SCIATICA OF RIGHT SIDE: ICD-10-CM

## 2024-09-09 DIAGNOSIS — Z12.11 COLON CANCER SCREENING: ICD-10-CM

## 2024-09-09 DIAGNOSIS — Z00.01 ENCOUNTER FOR WELL ADULT EXAM WITH ABNORMAL FINDINGS: Primary | ICD-10-CM

## 2024-09-09 DIAGNOSIS — Z23 NEED FOR VACCINATION: ICD-10-CM

## 2024-09-09 DIAGNOSIS — Z13.220 SCREENING, LIPID: ICD-10-CM

## 2024-09-09 DIAGNOSIS — R73.03 PREDIABETES: ICD-10-CM

## 2024-09-09 LAB
ALBUMIN SERPL-MCNC: 3.6 G/DL (ref 3.5–5.2)
ALP SERPL-CCNC: 94 U/L (ref 40–129)
ALT SERPL-CCNC: 29 U/L (ref 0–40)
ANION GAP SERPL CALCULATED.3IONS-SCNC: 9 MMOL/L (ref 7–16)
AST SERPL-CCNC: 19 U/L (ref 0–39)
BASOPHILS # BLD: 0.03 K/UL (ref 0–0.2)
BASOPHILS NFR BLD: 1 % (ref 0–2)
BILIRUB SERPL-MCNC: 0.3 MG/DL (ref 0–1.2)
BUN SERPL-MCNC: 16 MG/DL (ref 6–20)
CALCIUM SERPL-MCNC: 8.7 MG/DL (ref 8.6–10.2)
CHLORIDE SERPL-SCNC: 101 MMOL/L (ref 98–107)
CHOLEST SERPL-MCNC: 245 MG/DL
CO2 SERPL-SCNC: 28 MMOL/L (ref 22–29)
CREAT SERPL-MCNC: 1.3 MG/DL (ref 0.7–1.2)
EOSINOPHIL # BLD: 0.3 K/UL (ref 0.05–0.5)
EOSINOPHILS RELATIVE PERCENT: 5 % (ref 0–6)
ERYTHROCYTE [DISTWIDTH] IN BLOOD BY AUTOMATED COUNT: 15.4 % (ref 11.5–15)
GFR, ESTIMATED: 68 ML/MIN/1.73M2
GLUCOSE SERPL-MCNC: 96 MG/DL (ref 74–99)
HBA1C MFR BLD: 6.4 % (ref 4–5.6)
HCT VFR BLD AUTO: 37.6 % (ref 37–54)
HDLC SERPL-MCNC: 47 MG/DL
HGB BLD-MCNC: 12.2 G/DL (ref 12.5–16.5)
IMM GRANULOCYTES # BLD AUTO: <0.03 K/UL (ref 0–0.58)
IMM GRANULOCYTES NFR BLD: 0 % (ref 0–5)
LDLC SERPL CALC-MCNC: 180 MG/DL
LYMPHOCYTES NFR BLD: 2.87 K/UL (ref 1.5–4)
LYMPHOCYTES RELATIVE PERCENT: 50 % (ref 20–42)
MCH RBC QN AUTO: 27.4 PG (ref 26–35)
MCHC RBC AUTO-ENTMCNC: 32.4 G/DL (ref 32–34.5)
MCV RBC AUTO: 84.5 FL (ref 80–99.9)
MONOCYTES NFR BLD: 0.49 K/UL (ref 0.1–0.95)
MONOCYTES NFR BLD: 9 % (ref 2–12)
NEUTROPHILS NFR BLD: 36 % (ref 43–80)
NEUTS SEG NFR BLD: 2.08 K/UL (ref 1.8–7.3)
PLATELET # BLD AUTO: 300 K/UL (ref 130–450)
PMV BLD AUTO: 8.7 FL (ref 7–12)
POTASSIUM SERPL-SCNC: 4.3 MMOL/L (ref 3.5–5)
PROT SERPL-MCNC: 7.2 G/DL (ref 6.4–8.3)
RBC # BLD AUTO: 4.45 M/UL (ref 3.8–5.8)
SODIUM SERPL-SCNC: 138 MMOL/L (ref 132–146)
TRIGL SERPL-MCNC: 90 MG/DL
TSH SERPL DL<=0.05 MIU/L-ACNC: 5.91 UIU/ML (ref 0.27–4.2)
VLDLC SERPL CALC-MCNC: 18 MG/DL
WBC OTHER # BLD: 5.8 K/UL (ref 4.5–11.5)

## 2024-09-09 PROCEDURE — 84443 ASSAY THYROID STIM HORMONE: CPT

## 2024-09-09 PROCEDURE — 90471 IMMUNIZATION ADMIN: CPT | Performed by: FAMILY MEDICINE

## 2024-09-09 PROCEDURE — 80061 LIPID PANEL: CPT

## 2024-09-09 PROCEDURE — 90661 CCIIV3 VAC ABX FR 0.5 ML IM: CPT | Performed by: FAMILY MEDICINE

## 2024-09-09 PROCEDURE — 36415 COLL VENOUS BLD VENIPUNCTURE: CPT

## 2024-09-09 PROCEDURE — 83036 HEMOGLOBIN GLYCOSYLATED A1C: CPT

## 2024-09-09 PROCEDURE — 85025 COMPLETE CBC W/AUTO DIFF WBC: CPT

## 2024-09-09 PROCEDURE — 80053 COMPREHEN METABOLIC PANEL: CPT

## 2024-09-09 PROCEDURE — 99396 PREV VISIT EST AGE 40-64: CPT | Performed by: FAMILY MEDICINE

## 2024-09-09 RX ORDER — NAPROXEN 500 MG/1
500 TABLET ORAL 2 TIMES DAILY WITH MEALS
Qty: 60 TABLET | Refills: 5 | Status: SHIPPED | OUTPATIENT
Start: 2024-09-09

## 2024-09-09 RX ORDER — METHYLPREDNISOLONE 4 MG
TABLET, DOSE PACK ORAL
Qty: 1 KIT | Refills: 0 | Status: SHIPPED | OUTPATIENT
Start: 2024-09-09 | End: 2024-09-15

## 2024-09-09 SDOH — ECONOMIC STABILITY: FOOD INSECURITY: WITHIN THE PAST 12 MONTHS, THE FOOD YOU BOUGHT JUST DIDN'T LAST AND YOU DIDN'T HAVE MONEY TO GET MORE.: NEVER TRUE

## 2024-09-09 SDOH — ECONOMIC STABILITY: FOOD INSECURITY: WITHIN THE PAST 12 MONTHS, YOU WORRIED THAT YOUR FOOD WOULD RUN OUT BEFORE YOU GOT MONEY TO BUY MORE.: NEVER TRUE

## 2024-09-09 SDOH — ECONOMIC STABILITY: INCOME INSECURITY: HOW HARD IS IT FOR YOU TO PAY FOR THE VERY BASICS LIKE FOOD, HOUSING, MEDICAL CARE, AND HEATING?: NOT VERY HARD

## 2024-09-09 ASSESSMENT — ENCOUNTER SYMPTOMS
RHINORRHEA: 0
SINUS PRESSURE: 0
COUGH: 0
WHEEZING: 0
BACK PAIN: 1
DIARRHEA: 0
SORE THROAT: 0
NAUSEA: 0
VOMITING: 0
SHORTNESS OF BREATH: 0
ABDOMINAL PAIN: 0
CONSTIPATION: 0

## 2024-09-10 DIAGNOSIS — R73.03 PREDIABETES: Primary | ICD-10-CM

## 2024-09-17 ENCOUNTER — OFFICE VISIT (OUTPATIENT)
Dept: SURGERY | Age: 49
End: 2024-09-17
Payer: COMMERCIAL

## 2024-09-17 VITALS
SYSTOLIC BLOOD PRESSURE: 135 MMHG | OXYGEN SATURATION: 97 % | WEIGHT: 315 LBS | HEART RATE: 81 BPM | DIASTOLIC BLOOD PRESSURE: 87 MMHG | BODY MASS INDEX: 46.65 KG/M2 | TEMPERATURE: 99.5 F | HEIGHT: 69 IN | RESPIRATION RATE: 18 BRPM

## 2024-09-17 DIAGNOSIS — Z80.0 FAMILY HISTORY OF COLON CANCER IN MOTHER: Primary | ICD-10-CM

## 2024-09-17 PROCEDURE — 99203 OFFICE O/P NEW LOW 30 MIN: CPT | Performed by: SURGERY

## 2024-09-17 RX ORDER — SODIUM CHLORIDE 0.9 % (FLUSH) 0.9 %
5-40 SYRINGE (ML) INJECTION PRN
OUTPATIENT
Start: 2024-09-17

## 2024-09-17 RX ORDER — SODIUM CHLORIDE 0.9 % (FLUSH) 0.9 %
5-40 SYRINGE (ML) INJECTION EVERY 12 HOURS SCHEDULED
OUTPATIENT
Start: 2024-09-17

## 2024-09-17 RX ORDER — SODIUM CHLORIDE 9 MG/ML
25 INJECTION, SOLUTION INTRAVENOUS PRN
OUTPATIENT
Start: 2024-09-17

## 2024-09-17 NOTE — H&P (VIEW-ONLY)
Diabetes Brother     No Known Problems Brother     No Known Problems Sister     No Known Problems Sister     No Known Problems Sister     No Known Problems Sister     Diabetes Sister         Hypertentsion    Other Sister         pulmonary HTN    No Known Problems Son     No Known Problems Daughter        REVIEW OF SYSTEMS:    Constitutional: negative  Eyes: negative  Ears, nose, mouth, throat, and face: negative  Respiratory: negative  Cardiovascular: negative  Gastrointestinal: as in HPI  Genitourinary:negative  Integument/breast: negative  Hematologic/lymphatic: negative  Musculoskeletal:negative  Neurological: negative  Allergic/Immunologic: negative    PHYSICAL EXAM   /87   Pulse 81   Temp 99.5 °F (37.5 °C) (Infrared)   Resp 18   Ht 1.753 m (5' 9.02\")   Wt (!) 162.8 kg (359 lb)   SpO2 97%   BMI 52.99 kg/m²     General appearance: alert, cooperative and in no acute distress.  Eyes: Grossly normal   Lungs: normal work of breathing  Heart: regular rate  Abdomen:  soft, non-tender, non-distended  Skin: No skin abnormalities  Neurologic: Alert and oriented x 3. Grossly normal  Musculoskeletal: No edema.      ASSESSMENT AND PLAN:     Best Morrison II is an 49 y.o. male who presents for a high-risk screening colonoscopy for history of colon cancer in a first degree relative     All available labs and pathology reviewed.  All imaging independently reviewed and interpreted.   All provider notes reviewed.  The above was discussed with the patient at length.    I will set the patient up for a colonoscopy, possible biopsy, possible polypectomy.  I explained the risks including but not limited to bleeding, perforation leading to possible surgery, or infection. The benefits, alternatives, and potential complications associated with the above procedure to be performed and transfusions when applicable with the patient/responsible person prior to the procedure.       Physician Signature: Electronically signed by Kareem RUIZ

## 2024-09-18 ENCOUNTER — TELEPHONE (OUTPATIENT)
Dept: SURGERY | Age: 49
End: 2024-09-18

## 2024-10-03 NOTE — PROGRESS NOTES
[facility] PRE-ADMISSION TESTING INSTRUCTIONS    The Preadmission Testing patient is instructed accordingly using the following criteria (check applicable):    ARRIVAL INSTRUCTIONS:  [x] Parking the day of Surgery is located in the Main Entrance lot.  Upon entering the door, make an immediate right to the surgery reception desk    [x] Bring photo ID and insurance card    [] Bring in a copy of Living will or Durable Power of  papers.    [x] Please be sure to arrange for responsible adult to provide transportation to and from the hospital    [x] Please arrange for responsible adult to be with you for the 24 hour period post procedure due to having anesthesia    [x] If you awake am of surgery not feeling well or have temperature >100 please call [phone]    GENERAL INSTRUCTIONS:    [x] Nothing by mouth after midnight, including gum, candy, mints or water    [x] You may brush your teeth, but do not swallow any water    [] Take medications as instructed with 1-2 oz of water    [x] Stop herbal supplements and vitamins 5 days prior to procedure    [] Follow preop dosing of blood thinners per physician instructions    [] Take 1/2 dose of evening insulin, but no insulin after midnight    [x] No oral diabetic medications after midnight    [x] If diabetic and have low blood sugar or feel symptomatic, take 1-2oz apple juice only    [] Bring inhalers day of surgery    [] Bring C-PAP/ Bi-Pap day of surgery    [] Bring urine specimen day of surgery    [] Shower or bath with soap, lather and rinse well, AM of Surgery, no lotion, powders or creams to surgical site    [x] Follow bowel prep as instructed per surgeon    [] No tobacco products within 24 hours of surgery     [] No alcohol or illegal drug use within 24 hours of surgery.    [x] Jewelry, body piercing's, eyeglasses, contact lenses and dentures are not permitted into surgery (bring cases)      [] Please do not wear any nail polish, make

## 2024-10-07 ENCOUNTER — ANESTHESIA EVENT (OUTPATIENT)
Dept: ENDOSCOPY | Age: 49
End: 2024-10-07
Payer: COMMERCIAL

## 2024-10-07 ENCOUNTER — HOSPITAL ENCOUNTER (OUTPATIENT)
Age: 49
Setting detail: OUTPATIENT SURGERY
Discharge: HOME | End: 2024-10-07
Attending: SURGERY | Admitting: SURGERY
Payer: COMMERCIAL

## 2024-10-07 ENCOUNTER — ANESTHESIA (OUTPATIENT)
Dept: ENDOSCOPY | Age: 49
End: 2024-10-07
Payer: COMMERCIAL

## 2024-10-07 VITALS
RESPIRATION RATE: 18 BRPM | BODY MASS INDEX: 46.65 KG/M2 | TEMPERATURE: 98 F | HEART RATE: 83 BPM | HEIGHT: 69 IN | DIASTOLIC BLOOD PRESSURE: 82 MMHG | WEIGHT: 315 LBS | OXYGEN SATURATION: 100 % | SYSTOLIC BLOOD PRESSURE: 128 MMHG

## 2024-10-07 DIAGNOSIS — Z80.0 FAMILY HISTORY OF COLON CANCER IN MOTHER: ICD-10-CM

## 2024-10-07 DIAGNOSIS — Z80.0 FAMILY HISTORY OF COLON CANCER: ICD-10-CM

## 2024-10-07 LAB — GLUCOSE BLD-MCNC: 96 MG/DL (ref 74–99)

## 2024-10-07 PROCEDURE — 3700000000 HC ANESTHESIA ATTENDED CARE: Performed by: SURGERY

## 2024-10-07 PROCEDURE — 3700000001 HC ADD 15 MINUTES (ANESTHESIA): Performed by: SURGERY

## 2024-10-07 PROCEDURE — 6360000002 HC RX W HCPCS

## 2024-10-07 PROCEDURE — 7100000010 HC PHASE II RECOVERY - FIRST 15 MIN: Performed by: SURGERY

## 2024-10-07 PROCEDURE — 2709999900 HC NON-CHARGEABLE SUPPLY: Performed by: SURGERY

## 2024-10-07 PROCEDURE — 2500000003 HC RX 250 WO HCPCS

## 2024-10-07 PROCEDURE — 82962 GLUCOSE BLOOD TEST: CPT

## 2024-10-07 PROCEDURE — 2580000003 HC RX 258: Performed by: SURGERY

## 2024-10-07 PROCEDURE — 88305 TISSUE EXAM BY PATHOLOGIST: CPT

## 2024-10-07 PROCEDURE — 7100000011 HC PHASE II RECOVERY - ADDTL 15 MIN: Performed by: SURGERY

## 2024-10-07 PROCEDURE — 3609010600 HC COLONOSCOPY POLYPECTOMY SNARE/COLD BIOPSY: Performed by: SURGERY

## 2024-10-07 RX ORDER — SODIUM CHLORIDE 9 MG/ML
25 INJECTION, SOLUTION INTRAVENOUS PRN
Status: DISCONTINUED | OUTPATIENT
Start: 2024-10-07 | End: 2024-10-07 | Stop reason: HOSPADM

## 2024-10-07 RX ORDER — SODIUM CHLORIDE 0.9 % (FLUSH) 0.9 %
5-40 SYRINGE (ML) INJECTION PRN
Status: DISCONTINUED | OUTPATIENT
Start: 2024-10-07 | End: 2024-10-07 | Stop reason: HOSPADM

## 2024-10-07 RX ORDER — LIDOCAINE HYDROCHLORIDE 20 MG/ML
INJECTION, SOLUTION EPIDURAL; INFILTRATION; INTRACAUDAL; PERINEURAL
Status: DISCONTINUED | OUTPATIENT
Start: 2024-10-07 | End: 2024-10-07 | Stop reason: SDUPTHER

## 2024-10-07 RX ORDER — PROPOFOL 10 MG/ML
INJECTION, EMULSION INTRAVENOUS
Status: DISCONTINUED | OUTPATIENT
Start: 2024-10-07 | End: 2024-10-07 | Stop reason: SDUPTHER

## 2024-10-07 RX ORDER — SODIUM CHLORIDE 0.9 % (FLUSH) 0.9 %
5-40 SYRINGE (ML) INJECTION EVERY 12 HOURS SCHEDULED
Status: DISCONTINUED | OUTPATIENT
Start: 2024-10-07 | End: 2024-10-07 | Stop reason: HOSPADM

## 2024-10-07 RX ADMIN — LIDOCAINE HYDROCHLORIDE 100 MG: 20 INJECTION, SOLUTION EPIDURAL; INFILTRATION; INTRACAUDAL; PERINEURAL at 11:01

## 2024-10-07 RX ADMIN — SODIUM CHLORIDE: 9 INJECTION, SOLUTION INTRAVENOUS at 10:57

## 2024-10-07 RX ADMIN — PROPOFOL 70 MG: 10 INJECTION, EMULSION INTRAVENOUS at 11:01

## 2024-10-07 RX ADMIN — PROPOFOL 450 MG: 10 INJECTION, EMULSION INTRAVENOUS at 11:02

## 2024-10-07 ASSESSMENT — PAIN - FUNCTIONAL ASSESSMENT: PAIN_FUNCTIONAL_ASSESSMENT: 0-10

## 2024-10-07 NOTE — ANESTHESIA POSTPROCEDURE EVALUATION
Department of Anesthesiology  Postprocedure Note    Patient: Best Morrison II  MRN: [id number]  YOB: 1975  Date of evaluation: 10/7/2024    Procedure Summary       Date: 10/07/24 Room / Location: [place] [address] / [facility]    Anesthesia Start: 1057 Anesthesia Stop: 1125    Procedure: COLONOSCOPY POLYPECTOMY SNARE/BIOPSY Diagnosis:       Family history of colon cancer      (Family history of colon cancer [Z80.0])    Surgeons: Kareem Lima DO Responsible Provider: Angelica Tang DO    Anesthesia Type: MAC ASA Status: 3            Anesthesia Type: MAC    Tawny Phase I: Tawny Score: 10    Tawny Phase II: Tawny Score: 10    Anesthesia Post Evaluation    Patient location during evaluation: PACU  Patient participation: complete - patient participated  Level of consciousness: awake and alert  Airway patency: patent  Nausea & Vomiting: no nausea and no vomiting  Cardiovascular status: hemodynamically stable  Respiratory status: acceptable  Hydration status: euvolemic  Pain management: adequate    No notable events documented.

## 2024-10-07 NOTE — ANESTHESIA PRE PROCEDURE
\"PHART\", \"PO2ART\", \"XOQ2KVL\", \"BVK6PKT\", \"BEART\", \"Q8VGHUZQ\"     Type & Screen (If Applicable):  No results found for: \"ABORH\", \"LABANTI\"    Drug/Infectious Status (If Applicable):  No results found for: \"HIV\", \"HEPCAB\"    COVID-19 Screening (If Applicable):   Lab Results   Component Value Date/Time    COVID19 Not-Detected 12/12/2023 03:14 PM    COVID19 Not Detected 08/12/2020 12:00 PM           Anesthesia Evaluation  Patient summary reviewed   no history of anesthetic complications:   Airway: Mallampati: III  TM distance: >3 FB   Neck ROM: full  Mouth opening: > = 3 FB   Dental: normal exam         Pulmonary: breath sounds clear to auscultation  (+)     sleep apnea:                                  Cardiovascular:            Rhythm: regular             Beta Blocker:  Not on Beta Blocker         Neuro/Psych:   (+) neuromuscular disease:            GI/Hepatic/Renal:   (+) bowel prep, morbid obesity         ROS comment: Family history of colon cancer.   Endo/Other:    (+) DiabetesType II DM.                 Abdominal:             Vascular: negative vascular ROS.         Other Findings:             Anesthesia Plan      MAC     ASA 3       Induction: intravenous.    MIPS: Prophylactic antiemetics administered.  Anesthetic plan and risks discussed with patient and spouse.      Plan discussed with CRNA.                    EUSEBIO STEWART DO   10/7/2024

## 2024-10-07 NOTE — INTERVAL H&P NOTE
Update History & Physical    The patient's History and Physical of September 17, 2024 was reviewed with the patient and I examined the patient. There was no change. The surgical site was confirmed by the patient and me.     Plan: The risks, benefits, expected outcome, and alternative to the recommended procedure have been discussed with the patient. Patient understands and wants to proceed with the procedure.     Electronically signed by Kareem Lima DO on 10/7/2024 at 10:22 AM

## 2024-10-07 NOTE — DISCHARGE INSTRUCTIONS
[facility] Colonoscopy PROCEDURE DISCHARGE INSTRUCTIONS  You may be drowsy or lightheaded after receiving sedation or anesthesia.    A responsible person should be with you for the next 24 hours.    Please follow the instructions checked below:    DIET INSTRUCTIONS:  [x]Start with light diet and progress to your normal diet as you feel like eating. If you experience nausea or repeated episodes of vomiting which persist beyond 12-24 hours, notify your doctor.  []Other     ACTIVITY INSTRUCTIONS:  [x]Rest today. Increase activity as tolerated    []No heavy lifting or strenuous activity     [x]No driving for today  []Other      MEDICATION INSTRUCTIONS:    []Prescriptions sent with you.  Use as directed.  When taking pain medications, you may experience dizziness or drowsiness.  Do not drink alcohol or drive when taking these medications.  [x]Continue preop medications                               Post-procedure Care   If any tissue was removed:   It will be sent to a lab to be examined. It may take 1-2 weeks for results. The doctor will usually give an initial report after the scope is removed. Other tests may be recommended.   A small amount of bleeding may occur during the first few days after the procedure.     When you return home after the procedure, be sure to follow your doctor's instructions, which may include:   Resume medicines as instructed by your doctor.   Resume normal diet, unless directed otherwise by your doctor.   The sedative will make you drowsy. Avoid driving, operating machinery, or making important decisions for the rest of the day.   Rest for the remainder of the day.     After arriving home, contact your doctor if any of the following occurs:   Bleeding from your rectum, notify your doctor if you pass a teaspoonful of blood or more.   Black, tarry stools   Severe abdominal pain   Hard, swollen abdomen   Signs of infection, including fever or chills   Inability to pass gas or

## 2024-10-10 LAB — SURGICAL PATHOLOGY REPORT: NORMAL

## 2024-10-14 ENCOUNTER — OFFICE VISIT (OUTPATIENT)
Dept: FAMILY MEDICINE CLINIC | Age: 49
End: 2024-10-14
Payer: COMMERCIAL

## 2024-10-14 VITALS
OXYGEN SATURATION: 97 % | SYSTOLIC BLOOD PRESSURE: 132 MMHG | HEART RATE: 81 BPM | RESPIRATION RATE: 18 BRPM | DIASTOLIC BLOOD PRESSURE: 94 MMHG | BODY MASS INDEX: 46.65 KG/M2 | TEMPERATURE: 98.1 F | HEIGHT: 69 IN | WEIGHT: 315 LBS

## 2024-10-14 DIAGNOSIS — M54.31 SCIATICA OF RIGHT SIDE: Primary | ICD-10-CM

## 2024-10-14 DIAGNOSIS — R73.03 PREDIABETES: ICD-10-CM

## 2024-10-14 DIAGNOSIS — J30.2 SEASONAL ALLERGIC RHINITIS, UNSPECIFIED TRIGGER: ICD-10-CM

## 2024-10-14 DIAGNOSIS — R03.0 ELEVATED BLOOD PRESSURE READING IN OFFICE WITHOUT DIAGNOSIS OF HYPERTENSION: ICD-10-CM

## 2024-10-14 PROCEDURE — 99214 OFFICE O/P EST MOD 30 MIN: CPT | Performed by: FAMILY MEDICINE

## 2024-10-14 RX ORDER — CETIRIZINE HYDROCHLORIDE 10 MG/1
10 TABLET ORAL DAILY
Qty: 30 TABLET | Refills: 5 | Status: SHIPPED | OUTPATIENT
Start: 2024-10-14

## 2024-10-14 NOTE — PROGRESS NOTES
complications,  especially if left uncontrolled.    Counseled regarding the possible side effects, risks, benefits and alternatives to treatment; patient and/or guardian verbalizes understanding, agrees, feels comfortable with and wishes to proceed with above treatment plan.    Advised patient to call with any new medication issues, and read all Rx info from pharmacy to assure aware of all possible risks and side effects of medication before taking.    Reviewed age and gender appropriate health screening exams and vaccinations.  Advised patient regarding importance of keeping up with recommended health maintenance and to schedule as soon as possible if overdue, as this is important in assessing for undiagnosed pathology, especially cancer, as well as protecting against potentially harmful/life threatening disease.        Patient and/or guardian verbalizes understanding and agrees with above counseling, assessment and plan.    All questions answered.    Dorys Segura,   10/14/2024    I have personally reviewed and updated the chief complaint, HPI, Past Medical, Family and Social History, as well as the above Review of Systems.

## 2024-10-18 ENCOUNTER — TELEPHONE (OUTPATIENT)
Dept: SURGERY | Age: 49
End: 2024-10-18

## 2024-10-18 NOTE — TELEPHONE ENCOUNTER
----- Message from Dr. Kareem Lima, DO sent at 10/11/2024  9:12 AM EDT -----  Can let him know both polyps were benign so repeat colonoscopy in 10 years.

## 2024-10-21 ENCOUNTER — LAB (OUTPATIENT)
Dept: FAMILY MEDICINE CLINIC | Age: 49
End: 2024-10-21

## 2024-10-21 VITALS — DIASTOLIC BLOOD PRESSURE: 82 MMHG | SYSTOLIC BLOOD PRESSURE: 130 MMHG

## 2025-01-06 DIAGNOSIS — R73.03 PREDIABETES: ICD-10-CM

## 2025-01-06 DIAGNOSIS — J30.2 SEASONAL ALLERGIC RHINITIS, UNSPECIFIED TRIGGER: ICD-10-CM

## 2025-01-06 RX ORDER — CETIRIZINE HYDROCHLORIDE 10 MG/1
10 TABLET ORAL DAILY
Qty: 30 TABLET | Refills: 5 | Status: SHIPPED | OUTPATIENT
Start: 2025-01-06

## 2025-01-06 NOTE — TELEPHONE ENCOUNTER
Name of Medication(s) Requested:  Requested Prescriptions     Pending Prescriptions Disp Refills    metFORMIN (GLUCOPHAGE) 500 MG tablet 30 tablet 5     Sig: Take 1 tablet by mouth daily (with breakfast)    cetirizine (ZYRTEC) 10 MG tablet 30 tablet 5     Sig: Take 1 tablet by mouth daily       Medication is on current medication list Yes    Dosage and directions were verified? Yes    Quantity verified: 30 day supply     Pharmacy Verified?  Yes    Last Appointment:  10/14/2024    Future appts:  Future Appointments   Date Time Provider Department Center   1/27/2025  8:45 AM Dorys Segura DO [place] [facility]   1/27/2025  9:15 AM Michael Bond MD [place] [facility]        (If no appt send self scheduling link. .REFILLAPPT)  Scheduling request sent?     [] Yes  [x] No    Does patient need updated?  [] Yes  [x] No

## 2025-01-27 ENCOUNTER — OFFICE VISIT (OUTPATIENT)
Dept: SLEEP MEDICINE | Age: 50
End: 2025-01-27
Payer: COMMERCIAL

## 2025-01-27 VITALS
TEMPERATURE: 98.5 F | DIASTOLIC BLOOD PRESSURE: 86 MMHG | WEIGHT: 315 LBS | HEART RATE: 81 BPM | HEIGHT: 69 IN | RESPIRATION RATE: 17 BRPM | SYSTOLIC BLOOD PRESSURE: 147 MMHG | OXYGEN SATURATION: 99 % | BODY MASS INDEX: 46.65 KG/M2

## 2025-01-27 DIAGNOSIS — G47.33 OSA (OBSTRUCTIVE SLEEP APNEA): Primary | ICD-10-CM

## 2025-01-27 PROCEDURE — 99214 OFFICE O/P EST MOD 30 MIN: CPT | Performed by: STUDENT IN AN ORGANIZED HEALTH CARE EDUCATION/TRAINING PROGRAM

## 2025-01-27 ASSESSMENT — SLEEP AND FATIGUE QUESTIONNAIRES
ESS TOTAL SCORE: 5
HOW LIKELY ARE YOU TO NOD OFF OR FALL ASLEEP WHILE SITTING QUIETLY AFTER LUNCH WITHOUT ALCOHOL: MODERATE CHANCE OF DOZING
HOW LIKELY ARE YOU TO NOD OFF OR FALL ASLEEP WHILE LYING DOWN TO REST IN THE AFTERNOON WHEN CIRCUMSTANCES PERMIT: SLIGHT CHANCE OF DOZING
HOW LIKELY ARE YOU TO NOD OFF OR FALL ASLEEP IN A CAR, WHILE STOPPED FOR A FEW MINUTES IN TRAFFIC: WOULD NEVER DOZE
HOW LIKELY ARE YOU TO NOD OFF OR FALL ASLEEP WHILE SITTING AND READING: SLIGHT CHANCE OF DOZING
HOW LIKELY ARE YOU TO NOD OFF OR FALL ASLEEP WHILE WATCHING TV: SLIGHT CHANCE OF DOZING
HOW LIKELY ARE YOU TO NOD OFF OR FALL ASLEEP WHILE SITTING INACTIVE IN A PUBLIC PLACE: WOULD NEVER DOZE
HOW LIKELY ARE YOU TO NOD OFF OR FALL ASLEEP WHEN YOU ARE A PASSENGER IN A CAR FOR AN HOUR WITHOUT A BREAK: WOULD NEVER DOZE
HOW LIKELY ARE YOU TO NOD OFF OR FALL ASLEEP WHILE SITTING AND TALKING TO SOMEONE: WOULD NEVER DOZE

## 2025-01-27 NOTE — PROGRESS NOTES
a meeting) 0 2 1    As a passenger in a car for an hour without a break 0 2 1    Lying down to rest in the afternoon when circumstances permit 1 3 2    Sitting and talking to someone 0 0 2    Sitting quietly after a lunch without alcohol 2 1 2    In a car, while stopped for a few minutes in traffic 0 0 0    [place] Sleepiness Score 5 12 12    Neck (Inches)    20.5       The 21st Century Cures Act allowed all electronic protected health information to be disseminated to patients under the Health Insurance Portability and Accountability Act (HIPAA). As such, there may be clinical language in these notes that may unintentionally confuse or offend patients. If this is a concern, please discuss this with your provider. This note may be dictated with vocal recognition software. All grammatical or semantic errors should be considered accordingly.    Level of Service:  Notes Reviewed: 3+ (PCP)  Labs Reviewed: 3+ (CBC, CMP, TSH)    Michael Bond M.D.  [facility] Sleep Medicine  Diplomate, American Board of Internal Medicine - Sleep Medicine  Diplomate, American Board of Internal Medicine

## 2025-04-15 DIAGNOSIS — J30.2 SEASONAL ALLERGIC RHINITIS, UNSPECIFIED TRIGGER: ICD-10-CM

## 2025-04-15 DIAGNOSIS — R73.03 PREDIABETES: ICD-10-CM

## 2025-04-15 DIAGNOSIS — M54.31 SCIATICA OF RIGHT SIDE: ICD-10-CM

## 2025-04-15 RX ORDER — CETIRIZINE HYDROCHLORIDE 10 MG/1
10 TABLET ORAL DAILY
Qty: 30 TABLET | Refills: 0 | Status: SHIPPED | OUTPATIENT
Start: 2025-04-15

## 2025-04-15 RX ORDER — NAPROXEN 500 MG/1
500 TABLET ORAL 2 TIMES DAILY WITH MEALS
Qty: 60 TABLET | Refills: 0 | Status: SHIPPED | OUTPATIENT
Start: 2025-04-15 | End: 2025-05-19 | Stop reason: SDUPTHER

## 2025-04-15 NOTE — TELEPHONE ENCOUNTER
Name of Medication(s) Requested:  Requested Prescriptions     Pending Prescriptions Disp Refills    naproxen (NAPROSYN) 500 MG tablet 60 tablet 5     Sig: Take 1 tablet by mouth 2 times daily (with meals)    metFORMIN (GLUCOPHAGE) 500 MG tablet 30 tablet 5     Sig: Take 1 tablet by mouth daily (with breakfast)    cetirizine (ZYRTEC) 10 MG tablet 30 tablet 5     Sig: Take 1 tablet by mouth daily       Medication is on current medication list Yes    Dosage and directions were verified? Yes    Quantity verified: 30 day supply     Pharmacy Verified?  Yes    Last Appointment:  10/14/2024    Future appts:  Future Appointments   Date Time Provider Department Center   1/26/2026 11:45 AM Michael Bond MD [place] [facility]        (If no appt send self scheduling link. .REFILLAPPT)  Scheduling request sent?     [x] Yes  [] No    Does patient need updated?  [] Yes  [x] No

## 2025-05-19 ENCOUNTER — OFFICE VISIT (OUTPATIENT)
Dept: FAMILY MEDICINE CLINIC | Age: 50
End: 2025-05-19
Payer: COMMERCIAL

## 2025-05-19 VITALS
TEMPERATURE: 98.2 F | HEIGHT: 69 IN | OXYGEN SATURATION: 96 % | RESPIRATION RATE: 20 BRPM | WEIGHT: 315 LBS | HEART RATE: 78 BPM | BODY MASS INDEX: 46.65 KG/M2 | SYSTOLIC BLOOD PRESSURE: 122 MMHG | DIASTOLIC BLOOD PRESSURE: 82 MMHG

## 2025-05-19 DIAGNOSIS — R06.02 SHORTNESS OF BREATH: ICD-10-CM

## 2025-05-19 DIAGNOSIS — R06.09 DYSPNEA ON EXERTION: ICD-10-CM

## 2025-05-19 DIAGNOSIS — M54.17 LUMBOSACRAL RADICULITIS: ICD-10-CM

## 2025-05-19 DIAGNOSIS — M54.31 SCIATICA OF RIGHT SIDE: ICD-10-CM

## 2025-05-19 DIAGNOSIS — R07.9 CHEST PAIN, UNSPECIFIED TYPE: ICD-10-CM

## 2025-05-19 DIAGNOSIS — R73.03 PREDIABETES: Primary | ICD-10-CM

## 2025-05-19 LAB — HBA1C MFR BLD: 5.8 %

## 2025-05-19 PROCEDURE — 93000 ELECTROCARDIOGRAM COMPLETE: CPT | Performed by: FAMILY MEDICINE

## 2025-05-19 PROCEDURE — 99214 OFFICE O/P EST MOD 30 MIN: CPT | Performed by: FAMILY MEDICINE

## 2025-05-19 PROCEDURE — 83036 HEMOGLOBIN GLYCOSYLATED A1C: CPT | Performed by: FAMILY MEDICINE

## 2025-05-19 RX ORDER — NAPROXEN 500 MG/1
500 TABLET ORAL 2 TIMES DAILY WITH MEALS
Qty: 60 TABLET | Refills: 5 | Status: SHIPPED | OUTPATIENT
Start: 2025-05-19

## 2025-05-19 SDOH — ECONOMIC STABILITY: FOOD INSECURITY: WITHIN THE PAST 12 MONTHS, THE FOOD YOU BOUGHT JUST DIDN'T LAST AND YOU DIDN'T HAVE MONEY TO GET MORE.: SOMETIMES TRUE

## 2025-05-19 SDOH — ECONOMIC STABILITY: FOOD INSECURITY: WITHIN THE PAST 12 MONTHS, YOU WORRIED THAT YOUR FOOD WOULD RUN OUT BEFORE YOU GOT MONEY TO BUY MORE.: SOMETIMES TRUE

## 2025-05-19 ASSESSMENT — PATIENT HEALTH QUESTIONNAIRE - PHQ9
SUM OF ALL RESPONSES TO PHQ QUESTIONS 1-9: 0
2. FEELING DOWN, DEPRESSED OR HOPELESS: NOT AT ALL
1. LITTLE INTEREST OR PLEASURE IN DOING THINGS: NOT AT ALL

## 2025-05-19 NOTE — PATIENT INSTRUCTIONS
from 4pm to 7pm.   Phone Number: [phone] ext. [phone]  Website: [email or web address]  [facility]: [address] [facility] [place] [address]  What they offer: food and clothing  Phone Number: [phone]  [facility]: [address] [place] [address]  Phone Number: [phone]  [facility]: [address] [place] [address]  Phone Number: [phone]  [facility] [employer]  What they offer: food items that stop at various housing and community services organizations, follow a month schedule.  Call to learn more.  Phone Number: [phone]  [employer] $15.00 voucher; 1 per household per month; can request on site or call.   [facility]: [address] [place] [address]  What they offer: Emergency food assistance  Phone number: [phone]  Website: [employer]  [facility]:  [address] [place] [address]  What they offer: Serves breakfast and lunch daily, 7 AM-9AM and 11AM-1PM (closed Sundays)  Contact: [phone]  [facility]: [address] [place] [address]  What they offer: Hot evening meals on Mondays and Tuesdays; doors open at 4:00PM; dinner served 5PM-6PM  Phone Number: [phone]  Website: [employer]  [facility]: [address] [facility] [address]  Mon-Fri: 11am-12:30pm  Saturday: 10-11am  [facility]: [address] [place] [address]  Mon- Fri: 11:30 am- 1:00pm  First two Saturdays a month: 11:30am-1:00pm  [facility]: [address] [facility]  Breakfast: 6:30am-7am  Lunch: 1:00pm-1:30pm  Dinner: 6:00pm-6:30pm  [facility]: [address] [place] [id number]  Lunch: Monday- Saturday 12:00pm  Dinner: Monday-Saturday 4pm  [employer]:  What they offer: Frozen meals for private pay, government funded programs and some insurances   Phone Number:

## 2025-05-19 NOTE — PROGRESS NOTES
Best Morrison II (:  1975) is a 49 y.o. male, Established patient, here for evaluation of the following chief complaint(s):  Back Pain and Pre-Daibetes         Assessment & Plan  1. Back pain.  - Reports dull, achy pain in the lower back, rated at about a one on the pain scale.  - Pain worsens when lying down and improves with standing or sitting; no numbness, tingling, or weakness in the legs; no loss of bowel or bladder control.  - Updated x-ray will be ordered to assess any changes since the last imaging done three years ago.  - Referral to Dr. Carrasco, a chiropractor at [facility], will be provided.    2. Prediabetes.  - Hemoglobin A1c has improved from 6.4 to 5.8.  - Lost 9 pounds since 2025.  - Currently taking metformin 500 mg once a day.  - Continued monitoring of blood sugar levels and weight.    3. Shortness of breath.  - Experiences shortness of breath while walking, which started about six to eight months ago.  - Reports no chest pain but feels like his heart is racing occasionally.  - EKG will be conducted to ensure cardiac health.  - Further evaluation based on EKG results.  EKG showed sinus rhythm with widened QSR.   Stress test ordered.       4. Health maintenance.  - Using CPAP and it is going well when used; falls asleep very fast before using the CPAP.  - Prostate exam and blood work will be done at the age of 52.  - No immediate concerns noted; continued monitoring and follow-up as needed.    Results  Labs   - Hemoglobin A1c: 2025, 5.8%  Best was seen today for back pain and pre-daibetes.    Diagnoses and all orders for this visit:    Prediabetes  -     POCT glycosylated hemoglobin (Hb A1C)  -     CBC with Auto Differential  -     Comprehensive Metabolic Panel  -     Lipid Panel  -     TSH    Lumbosacral radiculitis  -     XR LUMBAR SPINE (MIN 4 VIEWS); Future  -     [facility] - [place] Carrasco DC,Chiropractic Medicine, [place]  -     naproxen (NAPROSYN) 500 MG tablet; Take 1 tablet by

## 2025-05-20 ENCOUNTER — RESULTS FOLLOW-UP (OUTPATIENT)
Dept: FAMILY MEDICINE CLINIC | Age: 50
End: 2025-05-20

## 2025-05-20 ENCOUNTER — TELEPHONE (OUTPATIENT)
Dept: CARDIOLOGY | Age: 50
End: 2025-05-20

## 2025-05-20 LAB
ALBUMIN: 3.8 G/DL (ref 3.5–5.2)
ALP BLD-CCNC: 85 U/L (ref 40–129)
ALT SERPL-CCNC: 24 U/L (ref 0–50)
ANION GAP SERPL CALCULATED.3IONS-SCNC: 11 MMOL/L (ref 7–16)
AST SERPL-CCNC: 27 U/L (ref 0–50)
BASOPHILS ABSOLUTE: 0.02 K/UL (ref 0–0.2)
BASOPHILS RELATIVE PERCENT: 0 % (ref 0–2)
BILIRUB SERPL-MCNC: 0.2 MG/DL (ref 0–1.2)
BUN BLDV-MCNC: 18 MG/DL (ref 6–20)
CALCIUM SERPL-MCNC: 8.9 MG/DL (ref 8.6–10)
CHLORIDE BLD-SCNC: 103 MMOL/L (ref 98–107)
CHOLESTEROL, TOTAL: 280 MG/DL
CO2: 23 MMOL/L (ref 22–29)
CREAT SERPL-MCNC: 1.1 MG/DL (ref 0.7–1.2)
EOSINOPHILS ABSOLUTE: 0.31 K/UL (ref 0.05–0.5)
EOSINOPHILS RELATIVE PERCENT: 7 % (ref 0–6)
GFR, ESTIMATED: 84 ML/MIN/1.73M2
GLUCOSE BLD-MCNC: 85 MG/DL (ref 74–99)
HCT VFR BLD CALC: 40.4 % (ref 37–54)
HDLC SERPL-MCNC: 51 MG/DL
HEMOGLOBIN: 12.7 G/DL (ref 12.5–16.5)
IMMATURE GRANULOCYTES %: 0 % (ref 0–5)
IMMATURE GRANULOCYTES ABSOLUTE: <0.03 K/UL (ref 0–0.58)
LDL CHOLESTEROL: 211 MG/DL
LYMPHOCYTES ABSOLUTE: 2.24 K/UL (ref 1.5–4)
LYMPHOCYTES RELATIVE PERCENT: 48 % (ref 20–42)
MCH RBC QN AUTO: 26.7 PG (ref 26–35)
MCHC RBC AUTO-ENTMCNC: 31.4 G/DL (ref 32–34.5)
MCV RBC AUTO: 85.1 FL (ref 80–99.9)
MONOCYTES ABSOLUTE: 0.44 K/UL (ref 0.1–0.95)
MONOCYTES RELATIVE PERCENT: 10 % (ref 2–12)
NEUTROPHILS ABSOLUTE: 1.63 K/UL (ref 1.8–7.3)
NEUTROPHILS RELATIVE PERCENT: 35 % (ref 43–80)
PDW BLD-RTO: 15.9 % (ref 11.5–15)
PLATELET # BLD: 325 K/UL (ref 130–450)
PMV BLD AUTO: 9.4 FL (ref 7–12)
POTASSIUM SERPL-SCNC: 4.3 MMOL/L (ref 3.5–5.1)
RBC # BLD: 4.75 M/UL (ref 3.8–5.8)
SODIUM BLD-SCNC: 137 MMOL/L (ref 136–145)
TOTAL PROTEIN: 7.5 G/DL (ref 6.4–8.3)
TRIGL SERPL-MCNC: 92 MG/DL
TSH SERPL DL<=0.05 MIU/L-ACNC: 3.98 UIU/ML (ref 0.27–4.2)
VLDLC SERPL CALC-MCNC: 18 MG/DL
WBC # BLD: 4.6 K/UL (ref 4.5–11.5)

## 2025-05-30 ENCOUNTER — HOSPITAL ENCOUNTER (OUTPATIENT)
Age: 50
Discharge: HOME | End: 2025-05-30
Payer: COMMERCIAL

## 2025-05-30 ENCOUNTER — HOSPITAL ENCOUNTER (OUTPATIENT)
Dept: GENERAL RADIOLOGY | Age: 50
End: 2025-05-30
Payer: COMMERCIAL

## 2025-05-30 DIAGNOSIS — M54.17 LUMBOSACRAL RADICULITIS: ICD-10-CM

## 2025-05-30 PROCEDURE — 72110 X-RAY EXAM L-2 SPINE 4/>VWS: CPT

## 2025-06-02 ENCOUNTER — OFFICE VISIT (OUTPATIENT)
Dept: CHIROPRACTIC MEDICINE | Age: 50
End: 2025-06-02
Payer: COMMERCIAL

## 2025-06-02 VITALS
SYSTOLIC BLOOD PRESSURE: 142 MMHG | HEIGHT: 69 IN | BODY MASS INDEX: 46.65 KG/M2 | HEART RATE: 92 BPM | OXYGEN SATURATION: 96 % | WEIGHT: 315 LBS | TEMPERATURE: 97.8 F | DIASTOLIC BLOOD PRESSURE: 82 MMHG

## 2025-06-02 DIAGNOSIS — G89.29 CHRONIC MIDLINE LOW BACK PAIN WITHOUT SCIATICA: ICD-10-CM

## 2025-06-02 DIAGNOSIS — M51.360 DEGENERATION OF INTERVERTEBRAL DISC OF LUMBAR REGION WITH DISCOGENIC BACK PAIN: Primary | ICD-10-CM

## 2025-06-02 DIAGNOSIS — M54.50 CHRONIC MIDLINE LOW BACK PAIN WITHOUT SCIATICA: ICD-10-CM

## 2025-06-02 PROCEDURE — 98940 CHIROPRACT MANJ 1-2 REGIONS: CPT | Performed by: CHIROPRACTOR

## 2025-06-02 PROCEDURE — 99203 OFFICE O/P NEW LOW 30 MIN: CPT | Performed by: CHIROPRACTOR

## 2025-06-02 ASSESSMENT — ENCOUNTER SYMPTOMS
BACK PAIN: 1
COUGH: 0
SHORTNESS OF BREATH: 0
BOWEL INCONTINENCE: 0

## 2025-06-02 NOTE — PROGRESS NOTES
[facility]    25  Best MCPHERSON Prince MASON : 1975 Sex: male  Age: 49 y.o.    Patient was referred by Dorys Segura DO    Chief Complaint   Patient presents with    Back Pain       Started  - had shot in back, helped greatly.    Has slowly gotten worse over past years  Reports prior chiropractic care, but none for approximately 10 years.    Back Pain  This is a chronic problem. The current episode started more than 1 year ago. The problem occurs intermittently. The problem has been gradually worsening since onset. The pain is present in the lumbar spine. The quality of the pain is described as aching. The pain does not radiate. The pain is mild. The symptoms are aggravated by standing and lying down (cutting grass). Stiffness is present In the morning. Pertinent negatives include no bladder incontinence, bowel incontinence, fever or leg pain. Risk factors include obesity. He has tried NSAIDs (brace, shot) for the symptoms. The treatment provided mild relief.         Red Flags:  none    Review of Systems   Constitutional:  Negative for chills and fever.   Respiratory:  Negative for cough and shortness of breath.    Gastrointestinal:  Negative for bowel incontinence.   Genitourinary:  Negative for bladder incontinence.   Musculoskeletal:  Positive for back pain.         Current Outpatient Medications:     naproxen (NAPROSYN) 500 MG tablet, Take 1 tablet by mouth 2 times daily (with meals), Disp: 60 tablet, Rfl: 5    metFORMIN (GLUCOPHAGE) 500 MG tablet, Take 1 tablet by mouth daily (with breakfast), Disp: 30 tablet, Rfl: 0    cetirizine (ZYRTEC) 10 MG tablet, Take 1 tablet by mouth daily, Disp: 30 tablet, Rfl: 0    Allergies   Allergen Reactions    Penicillins Rash       Past Medical History:   Diagnosis Date    Pre-diabetes     per patient, on Metformin     Family History   Problem Relation Age of Onset    Colon Cancer Mother 79    Cancer Father         Mutiple myloma    Unknown Father     No Known

## 2025-06-02 NOTE — PROGRESS NOTES
Patient is here for lower back pain. No injury. Pain is on and off.  Dorys Segura DO  Electronically signed by Alem Montenegro LPN on 6/2/2025 at 9:23 AM

## 2025-06-03 DIAGNOSIS — M54.17 LUMBOSACRAL RADICULITIS: Primary | ICD-10-CM

## 2025-06-03 DIAGNOSIS — M54.31 SCIATICA OF RIGHT SIDE: ICD-10-CM

## 2025-06-09 ENCOUNTER — TELEPHONE (OUTPATIENT)
Dept: CARDIOLOGY | Age: 50
End: 2025-06-09

## 2025-06-09 ENCOUNTER — OFFICE VISIT (OUTPATIENT)
Dept: CHIROPRACTIC MEDICINE | Age: 50
End: 2025-06-09
Payer: COMMERCIAL

## 2025-06-09 VITALS
HEIGHT: 69 IN | BODY MASS INDEX: 46.65 KG/M2 | DIASTOLIC BLOOD PRESSURE: 82 MMHG | WEIGHT: 315 LBS | SYSTOLIC BLOOD PRESSURE: 144 MMHG

## 2025-06-09 DIAGNOSIS — G89.29 CHRONIC MIDLINE LOW BACK PAIN WITHOUT SCIATICA: ICD-10-CM

## 2025-06-09 DIAGNOSIS — M51.360 DEGENERATION OF INTERVERTEBRAL DISC OF LUMBAR REGION WITH DISCOGENIC BACK PAIN: Primary | ICD-10-CM

## 2025-06-09 DIAGNOSIS — M54.50 CHRONIC MIDLINE LOW BACK PAIN WITHOUT SCIATICA: ICD-10-CM

## 2025-06-09 PROCEDURE — 98940 CHIROPRACT MANJ 1-2 REGIONS: CPT | Performed by: CHIROPRACTOR

## 2025-06-09 NOTE — TELEPHONE ENCOUNTER
Left message to schedule treadmill test.  Electronically signed by Leonie Sandoval on 6/9/2025 at 11:39 AM

## 2025-06-09 NOTE — PROGRESS NOTES
25  Best Morrison II : 1975 Sex: male  Age: 49 y.o.    Chief Complaint   Patient presents with    Follow-up      Lower back    hurting pretty good         HPI:   Pain has improved. On average, pain is perceived as mild (1-2  pain scale). Patient denies new numbness, new weakness, new tingling  HEP helping - will do stretches during day and as needed for relief      Current Outpatient Medications:     naproxen (NAPROSYN) 500 MG tablet, Take 1 tablet by mouth 2 times daily (with meals), Disp: 60 tablet, Rfl: 5    metFORMIN (GLUCOPHAGE) 500 MG tablet, Take 1 tablet by mouth daily (with breakfast), Disp: 30 tablet, Rfl: 0    cetirizine (ZYRTEC) 10 MG tablet, Take 1 tablet by mouth daily, Disp: 30 tablet, Rfl: 0    Exam:   Vitals:    25 1120   BP: (!) 144/82     Nontender in the midline today.  There are hypertonic and tender fibers noted with palpation in the paraspinal muscles of the lumbar region. Joint fixation is noted with motion screening at L5-S1, bilateral SI joints.    Best was seen today for follow-up.    Diagnoses and all orders for this visit:    Degeneration of intervertebral disc of lumbar region with discogenic back pain    Chronic midline low back pain without sciatica        Treatment Plan: Continue treatment today consisting of vibratory massage to low back for 2 minutes.  Sage flexion distraction manipulation at L5, protocol 2.  Drop table manipulation of the SI joints.  Tolerated well.  He will continue with his HEP.  He starts with physical therapy next week.  I will see him in 1 week for follow-up.  I did let him know we may add in some EMS depending on his pain levels, but since he was -2/10 today we will hold off.  Focus on HEP      Seen By:  Linwood Carrasco DC

## 2025-06-16 ENCOUNTER — OFFICE VISIT (OUTPATIENT)
Dept: CHIROPRACTIC MEDICINE | Age: 50
End: 2025-06-16
Payer: COMMERCIAL

## 2025-06-16 ENCOUNTER — HOSPITAL ENCOUNTER (OUTPATIENT)
Dept: PHYSICAL THERAPY | Age: 50
Setting detail: THERAPIES SERIES
Discharge: HOME | End: 2025-06-16
Payer: COMMERCIAL

## 2025-06-16 VITALS
DIASTOLIC BLOOD PRESSURE: 88 MMHG | SYSTOLIC BLOOD PRESSURE: 130 MMHG | WEIGHT: 315 LBS | HEIGHT: 69 IN | OXYGEN SATURATION: 97 % | TEMPERATURE: 98 F | BODY MASS INDEX: 46.65 KG/M2 | HEART RATE: 77 BPM

## 2025-06-16 DIAGNOSIS — G89.29 CHRONIC MIDLINE LOW BACK PAIN WITHOUT SCIATICA: ICD-10-CM

## 2025-06-16 DIAGNOSIS — M51.360 DEGENERATION OF INTERVERTEBRAL DISC OF LUMBAR REGION WITH DISCOGENIC BACK PAIN: Primary | ICD-10-CM

## 2025-06-16 DIAGNOSIS — M54.50 CHRONIC MIDLINE LOW BACK PAIN WITHOUT SCIATICA: ICD-10-CM

## 2025-06-16 PROCEDURE — 98940 CHIROPRACT MANJ 1-2 REGIONS: CPT | Performed by: CHIROPRACTOR

## 2025-06-16 PROCEDURE — 97161 PT EVAL LOW COMPLEX 20 MIN: CPT | Performed by: PHYSICAL THERAPIST

## 2025-06-16 ASSESSMENT — PAIN DESCRIPTION - PAIN TYPE: TYPE: CHRONIC PAIN

## 2025-06-16 ASSESSMENT — PAIN DESCRIPTION - LOCATION: LOCATION: BACK

## 2025-06-16 ASSESSMENT — PAIN SCALES - GENERAL: PAINLEVEL_OUTOF10: 3

## 2025-06-16 ASSESSMENT — PAIN DESCRIPTION - DESCRIPTORS: DESCRIPTORS: ACHING;BURNING

## 2025-06-16 ASSESSMENT — PAIN DESCRIPTION - ORIENTATION: ORIENTATION: RIGHT;LEFT

## 2025-06-16 NOTE — PROGRESS NOTES
[facility]                Phone: [phone]   Fax: [phone]    Physical Therapy Daily Treatment Note  Date:  2025    Patient Name:  Best Morrison II    :  1975  MRN: [id number]    Evaluating therapist:  BOBBY Escamilla      (25)  Restrictions/Precautions:    Diagnosis:  chronic LBP  Treatment Diagnosis:    Insurance/Certification information:  [place] BCBS  Referring Physician:  JOHN Segura  Plan of care signed (Y/N):    Visit# / total visits:  -  Pain level: 3/10   Time In:  Time Out:    Subjective:      Exercises:  Exercise/Equipment Resistance/Repetitions Other comments   bike              tball flex/rot            rot st     SKTC     piriformis st     hamst st            pelvic tilts               bridges                                                                Other Therapeutic Activities:      Home Exercise Program:  provided 25    Manual Treatments:      Modalities:  IFC/MH to LB     Timed Code Treatment Minutes:      Total Treatment Minutes:      Treatment/Activity Tolerance:  [] Patient tolerated treatment well [] Patient limited by fatique  [] Patient limited by pain  [] Patient limited by other medical complications  [] Other:     Prognosis: [] Good [] Fair  [] Poor    Patient Requires Follow-up: [] Yes  [] No    Plan:   [] Continue per plan of care [] Alter current plan (see comments)  [] Plan of care initiated [] Hold pending MD visit [] Discharge  Plan for Next Session:      See Weekly Progress Note: []  Yes  []  No  Next due:        Electronically signed by:  Jh Abbott PT

## 2025-06-16 NOTE — PROGRESS NOTES
Physical Therapy: Initial Evaluation    Patient: Best Morrison II (49 y.o. male)   Examination Date: 2025  Plan of Care Certification Period: 2025 to        :  1975 ;    Confirmed: Yes MRN: 1975  CSN: [id number]   Insurance: Payor: [facility] / Plan: BCBS - [place] HMO / Product Type: *No Product type* /   Insurance ID: [id number] - ([facility]) Secondary Insurance (if applicable):    Referring Physician: Dorys Segura DO     PCP: Dorys Segura DO Visits to Date/Visits Approved:     No Show/Cancelled Appts:   /       Medical Diagnosis: Lumbosacral radiculitis [M54.17]  Sciatica of right side [M54.31]    Treatment Diagnosis:       PERTINENT MEDICAL HISTORY           Medical History: Chart Reviewed: Yes   Past Medical History:   Diagnosis Date    Pre-diabetes     per patient, on Metformin     Surgical History:   Past Surgical History:   Procedure Laterality Date    COLONOSCOPY N/A 10/7/2024    COLONOSCOPY POLYPECTOMY SNARE/BIOPSY performed by Kareem Lima DO at [facility] ENDOSCOPY    NERVE BLOCK Left 2021    Left S1 transforaminal epidural steroid injection    NERVE BLOCK Left 2021    LEFT S1 TRANSFORAMINAL EPIDURAL STEROID INJECTION performed by Moinca Barrientos DO at [facility] OR    WISDOM TOOTH EXTRACTION         Medications:   Current Outpatient Medications:     naproxen (NAPROSYN) 500 MG tablet, Take 1 tablet by mouth 2 times daily (with meals), Disp: 60 tablet, Rfl: 5    metFORMIN (GLUCOPHAGE) 500 MG tablet, Take 1 tablet by mouth daily (with breakfast), Disp: 30 tablet, Rfl: 0    cetirizine (ZYRTEC) 10 MG tablet, Take 1 tablet by mouth daily, Disp: 30 tablet, Rfl: 0  Allergies: Penicillins      SUBJECTIVE EXAMINATION      ,           Subjective History: Onset Date: 25  Subjective: pt presents to therapy with c/o LBP for the last 4 yrs of insidious onset; no PMH for LB/LE injury/sx per pt;  x-ray of lumbar spine + for mild disc space  narrowing at

## 2025-06-16 NOTE — PROGRESS NOTES
25  Best Morrison II : 1975 Sex: male  Age: 49 y.o.    Chief Complaint   Patient presents with    Back Pain       HPI:   Pain is unchanged. On average, pain is perceived as mild (1-3  pain scale). Patient denies new numbness, new weakness, new tingling  Improved some.  He's noticed difference since starting care   bike stepper elliptical  - no flare up.          Current Outpatient Medications:     naproxen (NAPROSYN) 500 MG tablet, Take 1 tablet by mouth 2 times daily (with meals), Disp: 60 tablet, Rfl: 5    metFORMIN (GLUCOPHAGE) 500 MG tablet, Take 1 tablet by mouth daily (with breakfast), Disp: 30 tablet, Rfl: 0    cetirizine (ZYRTEC) 10 MG tablet, Take 1 tablet by mouth daily, Disp: 30 tablet, Rfl: 0    Exam:   Vitals:    25 1400   Pulse: 77   Temp: 98 °F (36.7 °C)   SpO2: 97%       There are hypertonic and tender fibers noted with palpation in the paraspinal muscles of the lumbar region. Joint fixation is noted with motion screening at L5-S1, bilateral SI joints.    Best was seen today for back pain.    Diagnoses and all orders for this visit:    Degeneration of intervertebral disc of lumbar region with discogenic back pain    Chronic midline low back pain without sciatica        Treatment Plan:    Continued treatment today consisting of vibratory massage to low back for 2 minutes. Sage flexion distraction manipulation at L5, protocol 2. Drop table manipulation of the SI joints. Tolerated well. He will continue with his HEP.  I will see him back next week.      Seen By:  Linwood Carrasco DC

## 2025-06-16 NOTE — PROGRESS NOTES
Patient is here for follow up lower back pain. Patient states pain is about the same. Dorys Segura DO  Electronically signed by Alem Montenegro LPN on 6/16/2025 at 2:01 PM

## 2025-06-20 ENCOUNTER — TELEPHONE (OUTPATIENT)
Dept: CARDIOLOGY | Age: 50
End: 2025-06-20

## 2025-06-20 NOTE — TELEPHONE ENCOUNTER
LEFT MESSAGE X 2 TO SCHEDULE EXERCISE TREADMILL STRESS TEST.  NO RETURN PHONE CALL TO SCHEDULE TEST.

## 2025-06-20 NOTE — TELEPHONE ENCOUNTER
Pt stated he forgot to contact them back to schedule stress test. Number was provided to pt to schedule stress test and pt stated he will call.

## 2025-06-23 ENCOUNTER — OFFICE VISIT (OUTPATIENT)
Dept: CHIROPRACTIC MEDICINE | Age: 50
End: 2025-06-23
Payer: COMMERCIAL

## 2025-06-23 ENCOUNTER — HOSPITAL ENCOUNTER (OUTPATIENT)
Dept: PHYSICAL THERAPY | Age: 50
Setting detail: THERAPIES SERIES
Discharge: HOME | End: 2025-06-23
Payer: COMMERCIAL

## 2025-06-23 VITALS
DIASTOLIC BLOOD PRESSURE: 96 MMHG | SYSTOLIC BLOOD PRESSURE: 142 MMHG | WEIGHT: 315 LBS | RESPIRATION RATE: 18 BRPM | BODY MASS INDEX: 46.65 KG/M2 | HEIGHT: 69 IN

## 2025-06-23 DIAGNOSIS — G89.29 CHRONIC MIDLINE LOW BACK PAIN WITHOUT SCIATICA: ICD-10-CM

## 2025-06-23 DIAGNOSIS — M51.360 DEGENERATION OF INTERVERTEBRAL DISC OF LUMBAR REGION WITH DISCOGENIC BACK PAIN: Primary | ICD-10-CM

## 2025-06-23 DIAGNOSIS — M54.50 CHRONIC MIDLINE LOW BACK PAIN WITHOUT SCIATICA: ICD-10-CM

## 2025-06-23 PROCEDURE — 98940 CHIROPRACT MANJ 1-2 REGIONS: CPT | Performed by: CHIROPRACTOR

## 2025-06-23 PROCEDURE — G0283 ELEC STIM OTHER THAN WOUND: HCPCS | Performed by: PHYSICAL THERAPIST

## 2025-06-23 PROCEDURE — 97110 THERAPEUTIC EXERCISES: CPT | Performed by: PHYSICAL THERAPIST

## 2025-06-23 NOTE — PROGRESS NOTES
25  Best Morrsion ISABELLA : 1975 Sex: male  Age: 49 y.o.    Chief Complaint   Patient presents with    Follow-up     Low back   feeling decent       HPI:   Pain has improved. On average, pain is perceived as mild (1 pain scale). Patient denies new numbness, new weakness, new tingling      Current Outpatient Medications:     naproxen (NAPROSYN) 500 MG tablet, Take 1 tablet by mouth 2 times daily (with meals), Disp: 60 tablet, Rfl: 5    metFORMIN (GLUCOPHAGE) 500 MG tablet, Take 1 tablet by mouth daily (with breakfast), Disp: 30 tablet, Rfl: 0    cetirizine (ZYRTEC) 10 MG tablet, Take 1 tablet by mouth daily, Disp: 30 tablet, Rfl: 0    Exam:   Vitals:    25 1131   BP: (!) 142/96   Resp: 18       There are hypertonic and tender fibers noted with palpation in the paraspinal muscles of the lumbar region. Joint fixation is noted with motion screening at L5-S1, bilateral SI joints.    Best was seen today for follow-up.    Diagnoses and all orders for this visit:    Degeneration of intervertebral disc of lumbar region with discogenic back pain    Chronic midline low back pain without sciatica        Treatment Plan: Continued treatment today consisting of vibratory massage to low back, Sage flexion distraction manipulation at L5, protocol 2 and drop table manipulation of the SI joints.  Tolerated well.  I will see him back next week for continuation of his trial      Seen By:  Linwood Carrasco, ROSS

## 2025-06-23 NOTE — PROGRESS NOTES
S:  pt presents to therapy for only scheduled visit for the week; at this time he reports that his LB feels about the same; no c/o buckling or LOB over last week's time; pain level given as 3/10 and is constant in nature; HEP going well per pt    O:  performed the exercises/treatments as written in the flowsheet for the week ending 6/27/25; initiated HEP for home management of condition; LB AROM grossly 75%WNL for all ranges; B LE strength is 5/5 for all planes     A:  marcos tx well; pt able to perform all requested tasks with good form and pacing noted; LB AROM/B LE strength grossly stable since eval; movement somewhat slow and guarded due to aching; gait stable with NORMAL mechanics across trunk/B LE's; endurance for all prolonged activities is GOOD-    P:  cont with POC of stretching/strengthening for LB/LE's with modalities as needed

## 2025-06-23 NOTE — PROGRESS NOTES
[facility]                Phone: [phone]   Fax: [phone]    Physical Therapy Daily Treatment Note  Date:  2025    Patient Name:  Best Morrison II    :  1975  MRN: [id number]    Evaluating therapist:  BOBBY Escamilla      (25)  Restrictions/Precautions:    Diagnosis:  chronic LBP  Treatment Diagnosis:    [facility]ification [facility]  Referring Physician:  JOHN Segura  Plan of care signed (Y/N):    Visit# / total visits:  -  Pain level: 3/10   Time In:  1000  Time Out:  1051    Subjective:      Exercises:  Exercise/Equipment Resistance/Repetitions Other comments   bike   10 min            tball flex/rot 5x10s           rot st 3x20s    SKTC 3x20s    piriformis st 3x20s    hamst st 3x20s           pelvic tilts 15x3s              bridges  15x3s           Total Gym squats with ball   3x10   L10                                                 Other Therapeutic Activities:      Home Exercise Program:  provided 25    Manual Treatments:      Modalities:  IFC/MH to LB x 15 min     Timed Code Treatment Minutes:      Total Treatment Minutes:      Treatment/Activity Tolerance:  [] Patient tolerated treatment well [] Patient limited by fatique  [] Patient limited by pain  [] Patient limited by other medical complications  [] Other:     Prognosis: [] Good [] Fair  [] Poor    Patient Requires Follow-up: [] Yes  [] No    Plan:   [] Continue per plan of care [] Alter current plan (see comments)  [] Plan of care initiated [] Hold pending MD visit [] Discharge  Plan for Next Session:      See Weekly Progress Note: []  Yes  []  No  Next due:        Electronically signed by:  Jh Abbott, PT

## 2025-06-30 ENCOUNTER — HOSPITAL ENCOUNTER (OUTPATIENT)
Dept: PHYSICAL THERAPY | Age: 50
Setting detail: THERAPIES SERIES
Discharge: HOME | End: 2025-06-30
Payer: COMMERCIAL

## 2025-06-30 PROCEDURE — 97012 MECHANICAL TRACTION THERAPY: CPT | Performed by: PHYSICAL THERAPIST

## 2025-06-30 PROCEDURE — 97110 THERAPEUTIC EXERCISES: CPT | Performed by: PHYSICAL THERAPIST

## 2025-06-30 NOTE — PROGRESS NOTES
S:  pt presents to therapy for only scheduled visit for the week; at this time he reports that his LB seems a bit better since last week; no c/o buckling or LOB over last week's time; pain level given as 3/10 and is constant in nature; HEP going well per pt    O:  performed the exercises/treatments as written in the flowsheet for the week ending 7/4/25;  LB AROM grossly 75%WNL for all ranges; B LE strength is 5/5 for all planes     A:  marcos tx well; pt able to perform all requested tasks with good form and pacing noted; LB AROM/B LE strength grossly stable since eval; movement seesm smoother and less guarded across trunk for all planes;  gait stable with NORMAL mechanics across trunk/B LE's; endurance for all prolonged activities is GOOD-    P:  cont with POC of stretching/strengthening for LB/LE's with modalities as needed

## 2025-06-30 NOTE — PROGRESS NOTES
[facility]                Phone: [phone]   Fax: [phone]    Physical Therapy Daily Treatment Note  Date:  2025    Patient Name:  Best Morrison II    :  1975  MRN: [id number]    Evaluating therapist:  BOBBY Escamilla      (25)  Restrictions/Precautions:    Diagnosis:  chronic LBP  Treatment Diagnosis:    [facility]  Referring Physician:  JOHN Segura  Plan of care signed (Y/N):    Visit# / total visits:  3/6-  Pain level: 3/10   Time In:  1555  Time Out:  1643    Subjective:      Exercises:  Exercise/Equipment Resistance/Repetitions Other comments   bike   10 min            tball flex/rot 5x10s           rot st 3x20s    SKTC 3x20s    piriformis st 3x20s    hamst st 3x20s           pelvic tilts 15x3s              bridges  15x3s           Total Gym squats with ball   3x10   L10                                                 Other Therapeutic Activities:      Home Exercise Program:  provided 25    Manual Treatments:      Modalities:  IFC/MH to LB x 15 min     Timed Code Treatment Minutes:      Total Treatment Minutes:      Treatment/Activity Tolerance:  [] Patient tolerated treatment well [] Patient limited by fatique  [] Patient limited by pain  [] Patient limited by other medical complications  [] Other:     Prognosis: [] Good [] Fair  [] Poor    Patient Requires Follow-up: [] Yes  [] No    Plan:   [] Continue per plan of care [] Alter current plan (see comments)  [] Plan of care initiated [] Hold pending MD visit [] Discharge  Plan for Next Session:      See Weekly Progress Note: []  Yes  []  No  Next due:        Electronically signed by:  Jh Abbott, GARETH

## 2025-07-01 ENCOUNTER — OFFICE VISIT (OUTPATIENT)
Dept: CHIROPRACTIC MEDICINE | Age: 50
End: 2025-07-01
Payer: COMMERCIAL

## 2025-07-01 VITALS
TEMPERATURE: 97.2 F | SYSTOLIC BLOOD PRESSURE: 130 MMHG | HEART RATE: 97 BPM | HEIGHT: 69 IN | BODY MASS INDEX: 46.65 KG/M2 | DIASTOLIC BLOOD PRESSURE: 88 MMHG | WEIGHT: 315 LBS | OXYGEN SATURATION: 97 %

## 2025-07-01 DIAGNOSIS — G89.29 CHRONIC MIDLINE LOW BACK PAIN WITHOUT SCIATICA: ICD-10-CM

## 2025-07-01 DIAGNOSIS — M51.360 DEGENERATION OF INTERVERTEBRAL DISC OF LUMBAR REGION WITH DISCOGENIC BACK PAIN: Primary | ICD-10-CM

## 2025-07-01 DIAGNOSIS — M54.50 CHRONIC MIDLINE LOW BACK PAIN WITHOUT SCIATICA: ICD-10-CM

## 2025-07-01 PROCEDURE — 98940 CHIROPRACT MANJ 1-2 REGIONS: CPT | Performed by: CHIROPRACTOR

## 2025-07-01 NOTE — PROGRESS NOTES
Patient is here for follow up lower back. Patient states no new concerns. Dorys Segura DO  Electronically signed by Alem Montenegro LPN on 7/1/2025 at 3:59 PM

## 2025-07-01 NOTE — PROGRESS NOTES
25  Best Morrison ISABELLA : 1975 Sex: male  Age: 49 y.o.    Chief Complaint   Patient presents with    Lower Back Pain       HPI:   Increased after yardwork over the weekend.  Did PT yesterday and seemed to calm back down      On average, pain is perceived as mild (1 pain scale). Patient denies new numbness, new weakness, new tingling.  Overall appears to be improving.      Current Outpatient Medications:     naproxen (NAPROSYN) 500 MG tablet, Take 1 tablet by mouth 2 times daily (with meals), Disp: 60 tablet, Rfl: 5    metFORMIN (GLUCOPHAGE) 500 MG tablet, Take 1 tablet by mouth daily (with breakfast), Disp: 30 tablet, Rfl: 0    cetirizine (ZYRTEC) 10 MG tablet, Take 1 tablet by mouth daily, Disp: 30 tablet, Rfl: 0    Exam:   Vitals:    25 1559   BP: 130/88   Pulse: 97   Temp: 97.2 °F (36.2 °C)   SpO2: 97%       There are hypertonic and tender fibers noted with palpation in the paraspinal muscles of the lumbar region. Joint fixation is noted with motion screening at L5-S1, bilateral SI joints    Best was seen today for lower back pain.    Diagnoses and all orders for this visit:    Degeneration of intervertebral disc of lumbar region with discogenic back pain    Chronic midline low back pain without sciatica        Treatment Plan: Continued with vibratory massage to low back today for 2 minutes.  Sage flexion distraction manipulation at L5, protocol 2 and drop table manipulation of the SI joints.  Tolerated well.  Go ahead back him out to 2 weeks.  He can contact us if he needs to get in sooner.  But with his PT and HEP he has several self care options.        Seen By:  Linwood Carrasco DC

## 2025-07-07 ENCOUNTER — TELEPHONE (OUTPATIENT)
Dept: CARDIOLOGY | Age: 50
End: 2025-07-07

## 2025-07-07 ENCOUNTER — HOSPITAL ENCOUNTER (OUTPATIENT)
Dept: PHYSICAL THERAPY | Age: 50
Setting detail: THERAPIES SERIES
Discharge: HOME | End: 2025-07-07

## 2025-07-07 NOTE — PROGRESS NOTES
[facility]                Phone: [phone]  Fax: [phone]    Physical Therapy  Cancellation/No-show Note  Patient Name:  Best Morrison II  :  1975   Date:  2025    For today's appointment patient:  []  Cancelled  []  Rescheduled appointment  [x]  No-show     Reason given by patient:  []  Patient ill  []  Conflicting appointment  []  No transportation    []  Conflict with work  [x]  No reason given  []  Other:     Comments:      Electronically signed by:  Jh Abbott, PT

## 2025-07-07 NOTE — TELEPHONE ENCOUNTER
Called patient 3x to attempt to schedule their stress test. No return call to schedule. Patient will be removed from workqueue list. Thank you.       Electronically signed by Soo Mendez on 7/7/2025 at 12:20 PM

## 2025-07-14 ENCOUNTER — OFFICE VISIT (OUTPATIENT)
Dept: CHIROPRACTIC MEDICINE | Age: 50
End: 2025-07-14
Payer: COMMERCIAL

## 2025-07-14 VITALS
HEART RATE: 87 BPM | BODY MASS INDEX: 46.65 KG/M2 | WEIGHT: 315 LBS | HEIGHT: 69 IN | SYSTOLIC BLOOD PRESSURE: 126 MMHG | DIASTOLIC BLOOD PRESSURE: 84 MMHG | TEMPERATURE: 97.8 F | OXYGEN SATURATION: 96 %

## 2025-07-14 DIAGNOSIS — M54.50 CHRONIC MIDLINE LOW BACK PAIN WITHOUT SCIATICA: ICD-10-CM

## 2025-07-14 DIAGNOSIS — M51.360 DEGENERATION OF INTERVERTEBRAL DISC OF LUMBAR REGION WITH DISCOGENIC BACK PAIN: Primary | ICD-10-CM

## 2025-07-14 DIAGNOSIS — G89.29 CHRONIC MIDLINE LOW BACK PAIN WITHOUT SCIATICA: ICD-10-CM

## 2025-07-14 PROCEDURE — 98940 CHIROPRACT MANJ 1-2 REGIONS: CPT | Performed by: CHIROPRACTOR

## 2025-07-14 NOTE — PROGRESS NOTES
25  Best Morrison II : 1975 Sex: male  Age: 49 y.o.    Chief Complaint   Patient presents with    Lower Back Pain       HPI:   Pain has improved. On average, pain is perceived as mild (1-3  pain scale). Patient denies new numbness, new weakness, new tingling  Missed last PT  Woke up stiff this morning, did stretches and it was gone within 2 minutes      Current Outpatient Medications:     naproxen (NAPROSYN) 500 MG tablet, Take 1 tablet by mouth 2 times daily (with meals), Disp: 60 tablet, Rfl: 5    metFORMIN (GLUCOPHAGE) 500 MG tablet, Take 1 tablet by mouth daily (with breakfast), Disp: 30 tablet, Rfl: 0    cetirizine (ZYRTEC) 10 MG tablet, Take 1 tablet by mouth daily, Disp: 30 tablet, Rfl: 0    Exam:   Vitals:    25 1408   BP: 126/84   Pulse: 87   Temp: 97.8 °F (36.6 °C)   SpO2: 96%       There are hypertonic and tender fibers noted with palpation in the paraspinal muscles of the lumbar region. Joint fixation is noted with motion screening at L4-5 S1 and bilateral SI joints.    Best was seen today for lower back pain.    Diagnoses and all orders for this visit:    Degeneration of intervertebral disc of lumbar region with discogenic back pain    Chronic midline low back pain without sciatica        Treatment Plan:    Continued with vibratory massage to low back today for 2 minutes. Sage flexion distraction manipulation at L5, protocol 2 and drop table manipulation of the SI joints. Tolerated well.  He appears to be improving.  He is going to contact PT to get back in.  I will see him back in 3-4 weeks for further care.      Seen By:  Lniwood Carrasco DC

## 2025-07-14 NOTE — PROGRESS NOTES
Patient is here for follow up lower back. Dorys Segura DO  Electronically signed by Alem Montenegro LPN on 7/14/2025 at 2:08 PM

## 2025-08-04 ENCOUNTER — OFFICE VISIT (OUTPATIENT)
Dept: CHIROPRACTIC MEDICINE | Age: 50
End: 2025-08-04
Payer: COMMERCIAL

## 2025-08-04 VITALS
HEIGHT: 69 IN | SYSTOLIC BLOOD PRESSURE: 136 MMHG | DIASTOLIC BLOOD PRESSURE: 87 MMHG | OXYGEN SATURATION: 97 % | HEART RATE: 79 BPM | TEMPERATURE: 97.9 F | BODY MASS INDEX: 46.65 KG/M2 | WEIGHT: 315 LBS

## 2025-08-04 DIAGNOSIS — M54.50 CHRONIC MIDLINE LOW BACK PAIN WITHOUT SCIATICA: ICD-10-CM

## 2025-08-04 DIAGNOSIS — M51.360 DEGENERATION OF INTERVERTEBRAL DISC OF LUMBAR REGION WITH DISCOGENIC BACK PAIN: Primary | ICD-10-CM

## 2025-08-04 DIAGNOSIS — G89.29 CHRONIC MIDLINE LOW BACK PAIN WITHOUT SCIATICA: ICD-10-CM

## 2025-08-04 PROCEDURE — 98940 CHIROPRACT MANJ 1-2 REGIONS: CPT | Performed by: CHIROPRACTOR

## 2025-08-05 ENCOUNTER — HOSPITAL ENCOUNTER (OUTPATIENT)
Dept: GENERAL RADIOLOGY | Age: 50
Discharge: HOME | End: 2025-08-07
Payer: COMMERCIAL

## 2025-08-05 ENCOUNTER — OFFICE VISIT (OUTPATIENT)
Dept: FAMILY MEDICINE CLINIC | Age: 50
End: 2025-08-05
Payer: COMMERCIAL

## 2025-08-05 VITALS
HEIGHT: 69 IN | RESPIRATION RATE: 20 BRPM | SYSTOLIC BLOOD PRESSURE: 130 MMHG | OXYGEN SATURATION: 97 % | DIASTOLIC BLOOD PRESSURE: 84 MMHG | BODY MASS INDEX: 46.65 KG/M2 | HEART RATE: 92 BPM | WEIGHT: 315 LBS | TEMPERATURE: 97.7 F

## 2025-08-05 DIAGNOSIS — M25.532 LEFT WRIST PAIN: ICD-10-CM

## 2025-08-05 DIAGNOSIS — J30.2 SEASONAL ALLERGIC RHINITIS, UNSPECIFIED TRIGGER: ICD-10-CM

## 2025-08-05 DIAGNOSIS — M54.17 LUMBOSACRAL RADICULITIS: ICD-10-CM

## 2025-08-05 DIAGNOSIS — M25.532 LEFT WRIST PAIN: Primary | ICD-10-CM

## 2025-08-05 DIAGNOSIS — M54.31 SCIATICA OF RIGHT SIDE: ICD-10-CM

## 2025-08-05 PROCEDURE — 73110 X-RAY EXAM OF WRIST: CPT

## 2025-08-05 PROCEDURE — 99214 OFFICE O/P EST MOD 30 MIN: CPT | Performed by: FAMILY MEDICINE

## 2025-08-05 RX ORDER — NAPROXEN 500 MG/1
500 TABLET ORAL 2 TIMES DAILY WITH MEALS
Qty: 60 TABLET | Refills: 5 | Status: SHIPPED | OUTPATIENT
Start: 2025-08-05

## 2025-08-05 RX ORDER — CETIRIZINE HYDROCHLORIDE 10 MG/1
10 TABLET ORAL DAILY
Qty: 30 TABLET | Refills: 5 | Status: SHIPPED | OUTPATIENT
Start: 2025-08-05

## 2025-08-25 ENCOUNTER — OFFICE VISIT (OUTPATIENT)
Dept: CHIROPRACTIC MEDICINE | Age: 50
End: 2025-08-25
Payer: COMMERCIAL

## 2025-08-25 VITALS
WEIGHT: 315 LBS | BODY MASS INDEX: 46.65 KG/M2 | OXYGEN SATURATION: 97 % | HEIGHT: 69 IN | TEMPERATURE: 97.6 F | HEART RATE: 106 BPM

## 2025-08-25 DIAGNOSIS — G89.29 CHRONIC MIDLINE LOW BACK PAIN WITHOUT SCIATICA: ICD-10-CM

## 2025-08-25 DIAGNOSIS — M51.360 DEGENERATION OF INTERVERTEBRAL DISC OF LUMBAR REGION WITH DISCOGENIC BACK PAIN: Primary | ICD-10-CM

## 2025-08-25 DIAGNOSIS — M54.50 CHRONIC MIDLINE LOW BACK PAIN WITHOUT SCIATICA: ICD-10-CM

## 2025-08-25 PROCEDURE — 98940 CHIROPRACT MANJ 1-2 REGIONS: CPT | Performed by: CHIROPRACTOR

## (undated) DEVICE — Z DISCONTINUED APPLICATOR SURG PREP 0.35OZ 2% CHG 70% ISO ALC W/ HI LT

## (undated) DEVICE — NEEDLE HYPO 18GA L1.5IN PNK POLYPR HUB S STL THN WALL FILL

## (undated) DEVICE — ENCORE® LATEX TEXTURED SIZE 6.5, STERILE LATEX POWDER-FREE SURGICAL GLOVE: Brand: ENCORE

## (undated) DEVICE — SNARE ENDOSCP L240CM LOOP W13MM SHTH DIA2.4MM SM OVL FLX

## (undated) DEVICE — NON-DEHP CATHETER EXTENSION SET, MALE LUER LOCK ADAPTER

## (undated) DEVICE — MARKER,SKIN,WI/RULER AND LABELS: Brand: MEDLINE

## (undated) DEVICE — TRAP POLYP ETRAP

## (undated) DEVICE — 6 ML SYRINGE LUER-LOCK TIP: Brand: MONOJECT

## (undated) DEVICE — BANDAGE ADH W1XL3IN NAT FAB WVN FLX DURABLE N ADH PD SEAL

## (undated) DEVICE — NEEDLE HYPO 25GA L1.5IN BLU POLYPR HUB S STL REG BVL STR

## (undated) DEVICE — SPONGE GZ W4XL4IN RAYON POLY CVR W/NONWOVEN FAB STRL 2/PK

## (undated) DEVICE — 3 ML SYRINGE LUER-LOCK TIP: Brand: MONOJECT

## (undated) DEVICE — FORCEPS BX L240CM JAW DIA2.4MM ORNG L CAP W/ NDL DISP RAD

## (undated) DEVICE — GRADUATE TRIANG MEASURE 1000ML BLK PRNT

## (undated) DEVICE — 3M™ RED DOT™ MONITORING ELECTRODE WITH FOAM TAPE AND STICKY GEL 2560, 50/BAG, 20/CASE, 72/PLT: Brand: RED DOT™

## (undated) DEVICE — TOWEL OR BLUEE 16X26IN ST 8 PACK ORB08 16X26ORTWL

## (undated) DEVICE — ELECTRODE PT RET AD L9FT HI MOIST COND ADH HYDRGEL CORDED

## (undated) DEVICE — GAUZE,SPONGE,4"X4",12PLY,STERILE,LF,2'S: Brand: MEDLINE